# Patient Record
Sex: MALE | Race: WHITE | NOT HISPANIC OR LATINO | Employment: UNEMPLOYED | URBAN - NONMETROPOLITAN AREA
[De-identification: names, ages, dates, MRNs, and addresses within clinical notes are randomized per-mention and may not be internally consistent; named-entity substitution may affect disease eponyms.]

---

## 2017-08-17 ENCOUNTER — HOSPITAL ENCOUNTER (EMERGENCY)
Facility: HOSPITAL | Age: 52
Discharge: HOME | End: 2017-08-17
Attending: EMERGENCY MEDICINE | Admitting: EMERGENCY MEDICINE

## 2017-08-17 ENCOUNTER — APPOINTMENT (OUTPATIENT)
Dept: GENERAL RADIOLOGY | Facility: HOSPITAL | Age: 52
End: 2017-08-17

## 2017-08-17 VITALS
DIASTOLIC BLOOD PRESSURE: 92 MMHG | WEIGHT: 183 LBS | HEART RATE: 63 BPM | OXYGEN SATURATION: 99 % | BODY MASS INDEX: 24.79 KG/M2 | HEIGHT: 72 IN | TEMPERATURE: 98 F | RESPIRATION RATE: 18 BRPM | SYSTOLIC BLOOD PRESSURE: 159 MMHG

## 2017-08-17 DIAGNOSIS — S29.012A MUSCLE STRAIN OF LEFT UPPER BACK, INITIAL ENCOUNTER: ICD-10-CM

## 2017-08-17 DIAGNOSIS — R07.9 CHEST PAIN, UNSPECIFIED TYPE: Primary | ICD-10-CM

## 2017-08-17 LAB
ALBUMIN SERPL-MCNC: 4.2 G/DL (ref 3.4–4.8)
ALBUMIN/GLOB SERPL: 1.6 G/DL (ref 1.1–1.8)
ALP SERPL-CCNC: 84 U/L (ref 38–126)
ALT SERPL W P-5'-P-CCNC: 46 U/L (ref 21–72)
ANION GAP SERPL CALCULATED.3IONS-SCNC: 11 MMOL/L (ref 5–15)
AST SERPL-CCNC: 31 U/L (ref 17–59)
BASOPHILS # BLD AUTO: 0.03 10*3/MM3 (ref 0–0.2)
BASOPHILS NFR BLD AUTO: 0.5 % (ref 0–2)
BILIRUB SERPL-MCNC: 1.3 MG/DL (ref 0.2–1.3)
BILIRUB UR QL STRIP: ABNORMAL
BUN BLD-MCNC: 11 MG/DL (ref 7–21)
BUN/CREAT SERPL: 11 (ref 7–25)
CALCIUM SPEC-SCNC: 9.1 MG/DL (ref 8.4–10.2)
CHLORIDE SERPL-SCNC: 105 MMOL/L (ref 95–110)
CK MB SERPL-CCNC: 0.53 NG/ML (ref 0–5)
CK SERPL-CCNC: 142 U/L (ref 55–170)
CLARITY UR: CLEAR
CO2 SERPL-SCNC: 23 MMOL/L (ref 22–31)
COLOR UR: YELLOW
CREAT BLD-MCNC: 1 MG/DL (ref 0.7–1.3)
DEPRECATED RDW RBC AUTO: 41.7 FL (ref 35.1–43.9)
EOSINOPHIL # BLD AUTO: 0.1 10*3/MM3 (ref 0–0.7)
EOSINOPHIL NFR BLD AUTO: 1.7 % (ref 0–7)
ERYTHROCYTE [DISTWIDTH] IN BLOOD BY AUTOMATED COUNT: 13.1 % (ref 11.5–14.5)
GFR SERPL CREATININE-BSD FRML MDRD: 79 ML/MIN/1.73 (ref 63–147)
GLOBULIN UR ELPH-MCNC: 2.7 GM/DL (ref 2.3–3.5)
GLUCOSE BLD-MCNC: 114 MG/DL (ref 60–100)
GLUCOSE UR STRIP-MCNC: NEGATIVE MG/DL
HCT VFR BLD AUTO: 39.8 % (ref 39–49)
HGB BLD-MCNC: 14.6 G/DL (ref 13.7–17.3)
HGB UR QL STRIP.AUTO: NEGATIVE
HOLD SPECIMEN: NORMAL
HOLD SPECIMEN: NORMAL
IMM GRANULOCYTES # BLD: 0.01 10*3/MM3 (ref 0–0.02)
IMM GRANULOCYTES NFR BLD: 0.2 % (ref 0–0.5)
INR PPP: 1.01 (ref 0.8–1.2)
KETONES UR QL STRIP: NEGATIVE
LEUKOCYTE ESTERASE UR QL STRIP.AUTO: NEGATIVE
LIPASE SERPL-CCNC: 81 U/L (ref 23–300)
LYMPHOCYTES # BLD AUTO: 1.43 10*3/MM3 (ref 0.6–4.2)
LYMPHOCYTES NFR BLD AUTO: 24.6 % (ref 10–50)
MCH RBC QN AUTO: 31.5 PG (ref 26.5–34)
MCHC RBC AUTO-ENTMCNC: 36.7 G/DL (ref 31.5–36.3)
MCV RBC AUTO: 86 FL (ref 80–98)
MONOCYTES # BLD AUTO: 0.62 10*3/MM3 (ref 0–0.9)
MONOCYTES NFR BLD AUTO: 10.7 % (ref 0–12)
NEUTROPHILS # BLD AUTO: 3.62 10*3/MM3 (ref 2–8.6)
NEUTROPHILS NFR BLD AUTO: 62.3 % (ref 37–80)
NITRITE UR QL STRIP: NEGATIVE
NRBC BLD MANUAL-RTO: 0 /100 WBC (ref 0–0)
NT-PROBNP SERPL-MCNC: 44.7 PG/ML (ref 0–450)
PH UR STRIP.AUTO: 6.5 [PH] (ref 5–9)
PLATELET # BLD AUTO: 169 10*3/MM3 (ref 150–450)
PMV BLD AUTO: 12 FL (ref 8–12)
POTASSIUM BLD-SCNC: 3.9 MMOL/L (ref 3.5–5.1)
PROT SERPL-MCNC: 6.9 G/DL (ref 6.3–8.6)
PROT UR QL STRIP: ABNORMAL
PROTHROMBIN TIME: 13.2 SECONDS (ref 11.1–15.3)
RBC # BLD AUTO: 4.63 10*6/MM3 (ref 4.37–5.74)
SODIUM BLD-SCNC: 139 MMOL/L (ref 137–145)
SP GR UR STRIP: 1.02 (ref 1–1.03)
TROPONIN I SERPL-MCNC: <0.012 NG/ML
TROPONIN I SERPL-MCNC: <0.012 NG/ML
UROBILINOGEN UR QL STRIP: ABNORMAL
WBC NRBC COR # BLD: 5.81 10*3/MM3 (ref 3.2–9.8)
WHOLE BLOOD HOLD SPECIMEN: NORMAL
WHOLE BLOOD HOLD SPECIMEN: NORMAL

## 2017-08-17 PROCEDURE — 85610 PROTHROMBIN TIME: CPT | Performed by: EMERGENCY MEDICINE

## 2017-08-17 PROCEDURE — 93010 ELECTROCARDIOGRAM REPORT: CPT | Performed by: INTERNAL MEDICINE

## 2017-08-17 PROCEDURE — 71020 HC CHEST PA AND LATERAL: CPT

## 2017-08-17 PROCEDURE — 80053 COMPREHEN METABOLIC PANEL: CPT | Performed by: EMERGENCY MEDICINE

## 2017-08-17 PROCEDURE — 84484 ASSAY OF TROPONIN QUANT: CPT | Performed by: EMERGENCY MEDICINE

## 2017-08-17 PROCEDURE — 99285 EMERGENCY DEPT VISIT HI MDM: CPT

## 2017-08-17 PROCEDURE — 93005 ELECTROCARDIOGRAM TRACING: CPT | Performed by: EMERGENCY MEDICINE

## 2017-08-17 PROCEDURE — 82553 CREATINE MB FRACTION: CPT | Performed by: EMERGENCY MEDICINE

## 2017-08-17 PROCEDURE — 85025 COMPLETE CBC W/AUTO DIFF WBC: CPT | Performed by: EMERGENCY MEDICINE

## 2017-08-17 PROCEDURE — 81003 URINALYSIS AUTO W/O SCOPE: CPT | Performed by: EMERGENCY MEDICINE

## 2017-08-17 PROCEDURE — 82550 ASSAY OF CK (CPK): CPT | Performed by: EMERGENCY MEDICINE

## 2017-08-17 PROCEDURE — 83690 ASSAY OF LIPASE: CPT | Performed by: EMERGENCY MEDICINE

## 2017-08-17 PROCEDURE — 83880 ASSAY OF NATRIURETIC PEPTIDE: CPT | Performed by: EMERGENCY MEDICINE

## 2017-08-17 RX ORDER — CARVEDILOL 12.5 MG/1
12.5 TABLET ORAL 2 TIMES DAILY WITH MEALS
COMMUNITY

## 2017-08-17 RX ORDER — ASPIRIN 81 MG/1
81 TABLET ORAL DAILY
Qty: 30 TABLET | Refills: 0 | Status: SHIPPED | OUTPATIENT
Start: 2017-08-17

## 2017-08-17 RX ORDER — NITROGLYCERIN 0.4 MG/1
0.4 TABLET SUBLINGUAL
Status: DISCONTINUED | OUTPATIENT
Start: 2017-08-17 | End: 2017-08-17 | Stop reason: HOSPADM

## 2017-08-17 RX ORDER — SODIUM CHLORIDE 0.9 % (FLUSH) 0.9 %
10 SYRINGE (ML) INJECTION AS NEEDED
Status: DISCONTINUED | OUTPATIENT
Start: 2017-08-17 | End: 2017-08-17 | Stop reason: HOSPADM

## 2017-08-17 RX ORDER — AMLODIPINE BESYLATE 10 MG/1
10 TABLET ORAL DAILY
COMMUNITY
End: 2020-03-24 | Stop reason: HOSPADM

## 2017-08-17 RX ORDER — IBUPROFEN 800 MG/1
800 TABLET ORAL EVERY 8 HOURS PRN
Qty: 21 TABLET | Refills: 0 | OUTPATIENT
Start: 2017-08-17 | End: 2019-12-08

## 2017-08-17 RX ORDER — ASPIRIN 325 MG
325 TABLET ORAL ONCE
Status: COMPLETED | OUTPATIENT
Start: 2017-08-17 | End: 2017-08-17

## 2017-08-17 RX ORDER — CYCLOBENZAPRINE HCL 10 MG
10 TABLET ORAL 3 TIMES DAILY PRN
Qty: 15 TABLET | Refills: 0 | OUTPATIENT
Start: 2017-08-17 | End: 2020-03-12

## 2017-08-17 RX ORDER — PANTOPRAZOLE SODIUM 40 MG/1
40 TABLET, DELAYED RELEASE ORAL DAILY
COMMUNITY
End: 2017-08-24

## 2017-08-17 RX ADMIN — ASPIRIN 325 MG: 325 TABLET, COATED ORAL at 13:19

## 2017-08-17 RX ADMIN — NITROGLYCERIN 0.4 MG: 0.4 TABLET SUBLINGUAL at 13:21

## 2017-08-17 NOTE — DISCHARGE INSTRUCTIONS
Followup with Dr. Wang as needed for further management and cardiac stress testing.  Start and aspirin a day, no strenuous activity until cleared with Dr. Wang.  Return with any new or worsening symptoms, or any concerns.

## 2017-08-17 NOTE — ED PROVIDER NOTES
Subjective   HPI Comments: Patient presents with an injury originally at work where the patient felt he pulled a muscle in his back.  Patient noted about 10 pm that the pain was also in his chest and he couldn't get comfortable.  Patient did have some radiation to the arm.  Patient continued with constant pain to this afternoon when patient was seen in the clinic, sent to the ED for further evaluation.  Patient denies diaphoresis.        History provided by:  Patient   used: No        Review of Systems   Constitutional: Negative.  Negative for appetite change, chills and fever.   HENT: Negative.  Negative for congestion.    Eyes: Negative.  Negative for photophobia and visual disturbance.   Respiratory: Positive for chest tightness. Negative for cough and shortness of breath.    Cardiovascular: Positive for chest pain. Negative for palpitations.   Gastrointestinal: Negative.  Negative for abdominal pain, constipation, diarrhea, nausea and vomiting.   Endocrine: Negative.    Genitourinary: Negative.  Negative for decreased urine volume, dysuria, flank pain and hematuria.   Musculoskeletal: Positive for back pain. Negative for arthralgias, myalgias, neck pain and neck stiffness.   Skin: Negative.  Negative for pallor.   Neurological: Negative.  Negative for dizziness, syncope, weakness, light-headedness, numbness and headaches.   Psychiatric/Behavioral: Negative.  Negative for confusion and suicidal ideas. The patient is not nervous/anxious.    All other systems reviewed and are negative.      History reviewed. No pertinent past medical history.    No Known Allergies    History reviewed. No pertinent surgical history.    History reviewed. No pertinent family history.    Social History     Social History   • Marital status:      Spouse name: N/A   • Number of children: N/A   • Years of education: N/A     Social History Main Topics   • Smoking status: Current Every Day Smoker     Packs/day:  "0.50     Types: Cigarettes   • Smokeless tobacco: None   • Alcohol use Yes      Comment: \"very little\"   • Drug use: No   • Sexual activity: Not Asked     Other Topics Concern   • None     Social History Narrative   • None           Objective   Physical Exam   Constitutional: He is oriented to person, place, and time. He appears well-developed and well-nourished. No distress.   HENT:   Head: Normocephalic and atraumatic.   Eyes: Conjunctivae and EOM are normal.   Neck: Normal range of motion. Neck supple. No JVD present.   Cardiovascular: Normal rate, regular rhythm, normal heart sounds and intact distal pulses.  Exam reveals no gallop and no friction rub.    No murmur heard.  Pulmonary/Chest: Effort normal. No respiratory distress. He has no wheezes. He has no rales. He exhibits no tenderness.   Abdominal: Soft. Bowel sounds are normal. He exhibits no distension and no mass. There is no tenderness. There is no rebound and no guarding.   Musculoskeletal: Normal range of motion.   Neurological: He is alert and oriented to person, place, and time.   Skin: Skin is warm and dry.   Psychiatric: He has a normal mood and affect. His behavior is normal. Judgment and thought content normal.   Nursing note and vitals reviewed.      ECG 12 Lead    Date/Time: 8/17/2017 1:35 PM  Performed by: KORIN LEE  Authorized by: KORIN LEE   Interpreted by physician  Rhythm: sinus bradycardia  Clinical impression: normal ECG  Comments: No ST elevation               ED Course  ED Course      Labs Reviewed   COMPREHENSIVE METABOLIC PANEL - Abnormal; Notable for the following:        Result Value    Glucose 114 (*)     All other components within normal limits   CBC WITH AUTO DIFFERENTIAL - Abnormal; Notable for the following:     MCHC 36.7 (*)     All other components within normal limits   TROPONIN (IN-HOUSE) - Normal   TROPONIN (IN-HOUSE) - Normal   BNP (IN-HOUSE) - Normal   PROTIME-INR - Normal    Narrative:     Therapeutic " range for most indications is 2.0-3.0 INR,  or 2.5-3.5 for mechanical heart valves.   CK - Normal   CK MB - Normal   LIPASE - Normal   RAINBOW DRAW    Narrative:     The following orders were created for panel order Alpha Draw.  Procedure                               Abnormality         Status                     ---------                               -----------         ------                     Light Blue Top[[id number]]                                    Final result               Green Top (Gel)[[id number]]                                   Final result               Lavender Top[[id number]]                                      Final result               Gold Top - SST[[id number]]                                   Final result                 Please view results for these tests on the individual orders.   URINALYSIS W/ CULTURE IF INDICATED   CBC AND DIFFERENTIAL    Narrative:     The following orders were created for panel order CBC & Differential.  Procedure                               Abnormality         Status                     ---------                               -----------         ------                     Scan Slide[[id number]]                                                                  CBC Auto Differential[[id number]]        Abnormal            Final result                 Please view results for these tests on the individual orders.   LIGHT BLUE TOP   GREEN TOP   LAVENDER TOP   GOLD TOP - SST       XR Chest 2 View   Final Result   Negative chest      Electronically signed by:  Shoaib Huggins MD  8/17/2017 1:56 PM CDT   Workstation: [id number]        No acute findings.  Will discharge to outpatient cardiology followup.  HEART score <3.             MDM    Final diagnoses:   Chest pain, unspecified type   Muscle strain of left upper back, initial encounter            Hemanth Murillo MD  08/17/17 [id number]

## 2017-08-24 ENCOUNTER — APPOINTMENT (OUTPATIENT)
Dept: LAB | Facility: HOSPITAL | Age: 52
End: 2017-08-24

## 2017-08-24 ENCOUNTER — OFFICE VISIT (OUTPATIENT)
Dept: CARDIOLOGY | Facility: CLINIC | Age: 52
End: 2017-08-24

## 2017-08-24 VITALS
BODY MASS INDEX: 27.62 KG/M2 | DIASTOLIC BLOOD PRESSURE: 90 MMHG | WEIGHT: 203.9 LBS | HEIGHT: 72 IN | OXYGEN SATURATION: 97 % | SYSTOLIC BLOOD PRESSURE: 138 MMHG | HEART RATE: 71 BPM

## 2017-08-24 DIAGNOSIS — R07.2 PRECORDIAL PAIN: Primary | ICD-10-CM

## 2017-08-24 DIAGNOSIS — F17.200 SMOKER: ICD-10-CM

## 2017-08-24 DIAGNOSIS — R09.89 BRUIT OF LEFT CAROTID ARTERY: ICD-10-CM

## 2017-08-24 DIAGNOSIS — Z13.220 SCREENING CHOLESTEROL LEVEL: ICD-10-CM

## 2017-08-24 DIAGNOSIS — Z13.1 DIABETES MELLITUS SCREENING: ICD-10-CM

## 2017-08-24 DIAGNOSIS — R06.09 DYSPNEA ON EXERTION: ICD-10-CM

## 2017-08-24 DIAGNOSIS — I73.9 CLAUDICATION (HCC): ICD-10-CM

## 2017-08-24 LAB
ALBUMIN SERPL-MCNC: 4.3 G/DL (ref 3.4–4.8)
ALBUMIN/GLOB SERPL: 1.7 G/DL (ref 1.1–1.8)
ALP SERPL-CCNC: 64 U/L (ref 38–126)
ALT SERPL W P-5'-P-CCNC: 40 U/L (ref 21–72)
ANION GAP SERPL CALCULATED.3IONS-SCNC: 8 MMOL/L (ref 5–15)
ARTICHOKE IGE QN: 118 MG/DL (ref 1–129)
AST SERPL-CCNC: 20 U/L (ref 17–59)
BILIRUB SERPL-MCNC: 0.8 MG/DL (ref 0.2–1.3)
BUN BLD-MCNC: 14 MG/DL (ref 7–21)
BUN/CREAT SERPL: 12.6 (ref 7–25)
CALCIUM SPEC-SCNC: 9.3 MG/DL (ref 8.4–10.2)
CHLORIDE SERPL-SCNC: 104 MMOL/L (ref 95–110)
CHOLEST SERPL-MCNC: 176 MG/DL (ref 0–199)
CO2 SERPL-SCNC: 27 MMOL/L (ref 22–31)
CREAT BLD-MCNC: 1.11 MG/DL (ref 0.7–1.3)
DEPRECATED RDW RBC AUTO: 41.5 FL (ref 35.1–43.9)
ERYTHROCYTE [DISTWIDTH] IN BLOOD BY AUTOMATED COUNT: 12.9 % (ref 11.5–14.5)
GFR SERPL CREATININE-BSD FRML MDRD: 70 ML/MIN/1.73 (ref 63–147)
GLOBULIN UR ELPH-MCNC: 2.5 GM/DL (ref 2.3–3.5)
GLUCOSE BLD-MCNC: 107 MG/DL (ref 60–100)
HBA1C MFR BLD: 5.4 % (ref 4–5.6)
HCT VFR BLD AUTO: 41.3 % (ref 39–49)
HDLC SERPL-MCNC: 47 MG/DL (ref 60–200)
HGB BLD-MCNC: 14.5 G/DL (ref 13.7–17.3)
LDLC/HDLC SERPL: 2.35 {RATIO} (ref 0–3.55)
MCH RBC QN AUTO: 30.8 PG (ref 26.5–34)
MCHC RBC AUTO-ENTMCNC: 35.1 G/DL (ref 31.5–36.3)
MCV RBC AUTO: 87.7 FL (ref 80–98)
NT-PROBNP SERPL-MCNC: 39.7 PG/ML (ref 0–450)
PLATELET # BLD AUTO: 155 10*3/MM3 (ref 150–450)
PMV BLD AUTO: 13.2 FL (ref 8–12)
POTASSIUM BLD-SCNC: 4.1 MMOL/L (ref 3.5–5.1)
PROT SERPL-MCNC: 6.8 G/DL (ref 6.3–8.6)
RBC # BLD AUTO: 4.71 10*6/MM3 (ref 4.37–5.74)
SODIUM BLD-SCNC: 139 MMOL/L (ref 137–145)
TRIGL SERPL-MCNC: 93 MG/DL (ref 20–199)
WBC NRBC COR # BLD: 6.75 10*3/MM3 (ref 3.2–9.8)

## 2017-08-24 PROCEDURE — 80053 COMPREHEN METABOLIC PANEL: CPT | Performed by: INTERNAL MEDICINE

## 2017-08-24 PROCEDURE — 83880 ASSAY OF NATRIURETIC PEPTIDE: CPT | Performed by: INTERNAL MEDICINE

## 2017-08-24 PROCEDURE — 36415 COLL VENOUS BLD VENIPUNCTURE: CPT | Performed by: INTERNAL MEDICINE

## 2017-08-24 PROCEDURE — 80061 LIPID PANEL: CPT | Performed by: INTERNAL MEDICINE

## 2017-08-24 PROCEDURE — 99204 OFFICE O/P NEW MOD 45 MIN: CPT | Performed by: INTERNAL MEDICINE

## 2017-08-24 PROCEDURE — 83036 HEMOGLOBIN GLYCOSYLATED A1C: CPT | Performed by: INTERNAL MEDICINE

## 2017-08-24 PROCEDURE — 85027 COMPLETE CBC AUTOMATED: CPT | Performed by: INTERNAL MEDICINE

## 2017-08-24 RX ORDER — LISINOPRIL 40 MG/1
40 TABLET ORAL
COMMUNITY
Start: 2017-08-21 | End: 2017-09-21

## 2017-08-24 NOTE — PROGRESS NOTES
Cardiovascular Medicine      Rohit Wang M.D., Ph.D., [facility]         Dr. Brown:  Thank you for asking me to see [facility] for CP.    History of Present Illness  This is a 51 y.o. male with:    1. CPS  2. HTN  3. GERD  4. MR, mild    Chest Pain  Patient's referred from the emergency department for complaints of chest pain.  The patient was actually lifting a heavy object and twisting when he began to have the onset of epigastric pain and back pain.  This pain in his abdominal area appeared to radiate into his back.  He initially attributed these symptoms to a musculoskeletal injury.  Later, he began to get some chest discomfort and arm discomfort.  This concerned him so he presented to the emergency department.  In the emergency department he was hemodynamically stable.  I did review these records today.  His EKG was not acute.  Chest x-ray was unremarkable.  His initial troponin was normal.  He was chest pain-free in the emergency department.  A delta troponin continued to be normal, so this pain was attributed to likely a musculoskeletal event.  The patient's HEART score was less than 3.  He was discharged home with close cardiology follow-up.  The patient denies a history of MI, CHF or known CAD.   He does have a history of hypertension for which she's currently on amlodipine, lisinopril and carvedilol.  His blood pressure is normally controlled.  He is a smoker.  No premature family history of CAD.    He has had CP in the past. He tells me it is identical to the pain he had in 2007, except it was worse in 2007. He had a MPS which was low-risk. He had a TTE that was structurally normal. He did have mild MR. He has had mild pain since being seen.      Valvular heart disease  He did have mild MR on a TTE in 2007.     LE pain  He tells me he has LE pain that the bilateral calf muscles. He has pain on the side on his leg.       Review of Systems - History obtained from chart review and the  patient  General ROS: negative  Cardiovascular ROS: positive for - chest pain.  All other systems were reviewed and were negative.    family history is not on file.     reports that he has been smoking Cigarettes.  He has been smoking about 0.50 packs per day. He does not have any smokeless tobacco history on file. He reports that he drinks alcohol. He reports that he does not use illicit drugs.    No Known Allergies      Current Outpatient Prescriptions:   •  amLODIPine (NORVASC) 10 MG tablet, Take 10 mg by mouth Daily., Disp: , Rfl:   •  aspirin 81 MG EC tablet, Take 1 tablet by mouth Daily., Disp: 30 tablet, Rfl: 0  •  carvedilol (COREG) 12.5 MG tablet, Take 12.5 mg by mouth 2 (Two) Times a Day With Meals., Disp: , Rfl:   •  cyclobenzaprine (FLEXERIL) 10 MG tablet, Take 1 tablet by mouth 3 (Three) Times a Day As Needed for Muscle Spasms., Disp: 15 tablet, Rfl: 0  •  ibuprofen (ADVIL,MOTRIN) 800 MG tablet, Take 1 tablet by mouth Every 8 (Eight) Hours As Needed for Mild Pain ., Disp: 21 tablet, Rfl: 0  •  LISINOPRIL PO, Take  by mouth., Disp: , Rfl:   •  Omeprazole (PRILOSEC PO), Take  by mouth., Disp: , Rfl:   •  pantoprazole (PROTONIX) 40 MG EC tablet, Take 40 mg by mouth Daily., Disp: , Rfl:     Physical Exam:  Vitals:    08/24/17 0926   BP: 138/90   Pulse:    SpO2:      Body mass index is 27.65 kg/(m^2).    GEN: alert, appears stated age and cooperative  Body Habitus: well-nourished  Neuro: CN II-XII grossly intact.   HEENT: Head: Normocephalic, no lesions, without obvious abnormality.  Neck / Thyroid: Supple, no masses, nodes, nodules or enlargement. No arcus senilis, xanthelasma or xanthomas. PERRL. Normal external ears. No drainage. No thyromegaly. Neck supple. No LAD. Trachea midline. Nose, normal.  JVP: 6 cm of water at 45 degrees HJR: absent      Carotid:  Upstroke: easily palpated bilaterally Volume: Normal.    Carotid Bruit:  Present, left  Subclavian Bruit: Not present.    Lymph: No overt LAD.   Back:  Normal.  Chest:  Normal Excursion: Good    I:E: Good  Pulmonary:clear to auscultation, no wheezes, rales or rhonchi, symmetric air entry. Equal chest excursion. Chest physical exam is normal. No tenderness.        Precordium:  No palpable heaves or thrusts. P2 is not palpable.   Wingate:  normal size and placement Palpable S4: Not present.   Heart rate: normal  Heart Rhythm: regular     Heart Sounds: S1: normal intensity  S2: normal intensity  S3: absent   S4: absent  Opening Snap: absent  A2-OS:  N/A  Pericardial rub: absent    Ejection click: None      Murmurs: Systolic: none  Diastolic: none  Abdomen: Soft, non-tender, normal bowel sounds; no bruits, organomegaly or masses.  Extremity: no edema, cyanosis  Pulses: Right radial artery has 2+ (normal) pulse and Left radial artery has 2+ (normal) pulse    DATA REVIEWED:     PROCEDURE: Chest PA and lateral     REASON FOR EXAM: cp     FINDINGS: Comparison study dated June 26, 2014. . Cardiac and  pulmonary vasculature are normal . Lungs are clear. Pleural  spaces are normal . No acute osseous abnormality.     IMPRESSION:  Negative chest     Electronically signed by:  Shoaib Huggins MD  8/17/2017 1:56 PM CDT  Workstation: [id number]      Normal sinus rhythm  Normal ECG  When compared with ECG of 26-JUN-2014 00:07,  premature supraventricular complexes are no longer present  T wave inversion no longer evident in Inferior leads  T wave amplitude has increased in Anterolateral leads  Confirmed by JUNG MAKI MD ([id number]),  SALONI REESE ([id number]) on  8/22/2017 4:00:53 PM    ED notes reviewed. Troponins not detectable x 2.         Assessment/Plan      1. Chest pain syndrome/Dyspnea. The pain complaints are atypical.  The patient is Moderate Risk.  An ischemia evaluation is indicated. The patient is able to exercise.  EKG is Normal.  -Risks/Benefits discussed.   -A hand out was provided on the type of stress test. Questions answered.   -I have asked the patient to call 911 or be  seen in the ED for further CP complaints.   -Will plan on further evaluation with TST, TTE.  -PFTs, 6MWT    2. Mild MR.   -Echo    3. LE pain, unable to exclude claudication.   -ABIs    4. Cardiac Risk Assessment and need for statin therapy:   Unknown. Will check basic labs.    5. Carotid Bruit  -Carotid Duplex    6. Tobacco status: Smoker.  I advised patient to quit, and offered support.  Educational material distributed.  less than 3 minutes      Plan for follow-up: in 1 month      EMR Dragon/Transcription disclaimer:     Much of this encounter note is an electronic transcription. This may permit erroneous, or at times, nonsensical words or phrases to be inadvertently transcribed; Although I have reviewed the note for such errors, some may still exist.

## 2017-08-24 NOTE — PATIENT INSTRUCTIONS
Exercise Stress Electrocardiogram  An exercise stress electrocardiogram is a test to check how blood flows to your heart. It is done to find areas of poor blood flow. You will need to walk on a treadmill for this test. The electrocardiogram will record your heartbeat when you are at rest and when you are exercising.  BEFORE THE PROCEDURE  · Do not have drinks with caffeine or foods with caffeine for 24 hours before the test, or as told by your doctor. This includes coffee, tea (even decaf tea), sodas, chocolate, and cocoa.  · Follow your doctor's instructions about eating and drinking before the test.  · Ask your doctor what medicines you should or should not take before the test. Take your medicines with water unless told by your doctor not to.  · If you use an inhaler, bring it with you to the test.  · Bring a snack to eat after the test.  · Do not  smoke for 4 hours before the test.  · Do not put lotions, powders, creams, or oils on your chest before the test.  · Wear comfortable shoes and clothing.  PROCEDURE  · You will have patches put on your chest. Small areas of your chest may need to be shaved. Wires will be connected to the patches.  · Your heart rate will be watched while you are resting and while you are exercising.  · You will walk on the treadmill. The treadmill will slowly get faster to raise your heart rate.  · The test will take about 1-2 hours.  AFTER THE PROCEDURE  · Your heart rate and blood pressure will be watched after the test.  · You may return to your normal diet, activities, and medicines or as told by your doctor.     This information is not intended to replace advice given to you by your health care provider. Make sure you discuss any questions you have with your health care provider.     Document Released: 06/05/2009 Document Revised: 01/08/2016 Document Reviewed: 08/25/2014  "[employer] Interactive Patient Education ©2017 "[employer] Inc.  Steps to Quit Smoking   Smoking tobacco can be  harmful to your health and can affect almost every organ in your body. Smoking puts you, and those around you, at risk for developing many serious chronic diseases. Quitting smoking is difficult, but it is one of the best things that you can do for your health. It is never too late to quit.  WHAT ARE THE BENEFITS OF QUITTING SMOKING?  When you quit smoking, you lower your risk of developing serious diseases and conditions, such as:  · Lung cancer or lung disease, such as COPD.  · Heart disease.  · Stroke.  · Heart attack.  · Infertility.  · Osteoporosis and bone fractures.  Additionally, symptoms such as coughing, wheezing, and shortness of breath may get better when you quit. You may also find that you get sick less often because your body is stronger at fighting off colds and infections. If you are pregnant, quitting smoking can help to reduce your chances of having a baby of low birth weight.  HOW DO I GET READY TO QUIT?  When you decide to quit smoking, create a plan to make sure that you are successful. Before you quit:  · Pick a date to quit. Set a date within the next two weeks to give you time to prepare.  · Write down the reasons why you are quitting. Keep this list in places where you will see it often, such as on your bathroom mirror or in your car or wallet.  · Identify the people, places, things, and activities that make you want to smoke (triggers) and avoid them. Make sure to take these actions:    Throw away all cigarettes at home, at work, and in your car.    Throw away smoking accessories, such as ashtrays and lighters.    Clean your car and make sure to empty the ashtray.    Clean your home, including curtains and carpets.  · Tell your family, friends, and coworkers that you are quitting. Support from your loved ones can make quitting easier.  · Talk with your health care provider about your options for quitting smoking.  · Find out what treatment options are covered by your health  "insurance.  WHAT STRATEGIES CAN I USE TO QUIT SMOKING?   Talk with your healthcare provider about different strategies to quit smoking. Some strategies include:  · Quitting smoking altogether instead of gradually lessening how much you smoke over a period of time. Research shows that quitting \"cold turkey\" is more successful than gradually quitting.  · Attending in-person counseling to help you build problem-solving skills. You are more likely to have success in quitting if you attend several counseling sessions. Even short sessions of 10 minutes can be effective.  · Finding resources and support systems that can help you to quit smoking and remain smoke-free after you quit. These resources are most helpful when you use them often. They can include:    Online chats with a counselor.    Telephone quitlines.    Printed self-help materials.    Support groups or group counseling.    Text messaging programs.    Mobile phone applications.  · Taking medicines to help you quit smoking. (If you are pregnant or breastfeeding, talk with your health care provider first.) Some medicines contain nicotine and some do not. Both types of medicines help with cravings, but the medicines that include nicotine help to relieve withdrawal symptoms. Your health care provider may recommend:    Nicotine patches, gum, or lozenges.    Nicotine inhalers or sprays.    Non-nicotine medicine that is taken by mouth.  Talk with your health care provider about combining strategies, such as taking medicines while you are also receiving in-person counseling. Using these two strategies together makes you more likely to succeed in quitting than if you used either strategy on its own.  If you are pregnant or breastfeeding, talk with your health care provider about finding counseling or other support strategies to quit smoking. Do not take medicine to help you quit smoking unless told to do so by your health care provider.  WHAT THINGS CAN I DO TO MAKE IT " EASIER TO QUIT?  Quitting smoking might feel overwhelming at first, but there is a lot that you can do to make it easier. Take these important actions:  · Reach out to your family and friends and ask that they support and encourage you during this time. Call telephone quitlines, reach out to support groups, or work with a counselor for support.  · Ask people who smoke to avoid smoking around you.  · Avoid places that trigger you to smoke, such as bars, parties, or smoke-break areas at work.  · Spend time around people who do not smoke.  · Lessen stress in your life, because stress can be a smoking trigger for some people. To lessen stress, try:    Exercising regularly.    Deep-breathing exercises.    Yoga.    Meditating.    Performing a body scan. This involves closing your eyes, scanning your body from head to toe, and noticing which parts of your body are particularly tense. Purposefully relax the muscles in those areas.  · Download or purchase mobile phone or tablet apps (applications) that can help you stick to your quit plan by providing reminders, tips, and encouragement. There are many free apps, such as QuitGuide from the CDC (Centers for Disease Control and Prevention). You can find other support for quitting smoking (smoking cessation) through smokefree.gov and other websites.  HOW WILL I FEEL WHEN I QUIT SMOKING?  Within the first 24 hours of quitting smoking, you may start to feel some withdrawal symptoms. These symptoms are usually most noticeable 2-3 days after quitting, but they usually do not last beyond 2-3 weeks. Changes or symptoms that you might experience include:  · Mood swings.  · Restlessness, anxiety, or irritation.  · Difficulty concentrating.  · Dizziness.  · Strong cravings for sugary foods in addition to nicotine.  · Mild weight gain.  · Constipation.  · Nausea.  · Coughing or a sore throat.  · Changes in how your medicines work in your body.  · A depressed mood.  · Difficulty sleeping  (insomnia).  After the first 2-3 weeks of quitting, you may start to notice more positive results, such as:  · Improved sense of smell and taste.  · Decreased coughing and sore throat.  · Slower heart rate.  · Lower blood pressure.  · Clearer skin.  · The ability to breathe more easily.  · Fewer sick days.  Quitting smoking is very challenging for most people. Do not get discouraged if you are not successful the first time. Some people need to make many attempts to quit before they achieve long-term success. Do your best to stick to your quit plan, and talk with your health care provider if you have any questions or concerns.     This information is not intended to replace advice given to you by your health care provider. Make sure you discuss any questions you have with your health care provider.     Document Released: 12/12/2002 Document Revised: 05/03/2016 Document Reviewed: 05/03/2016  [employer] Interactive Patient Education ©2017 [employer]

## 2018-01-13 ENCOUNTER — APPOINTMENT (OUTPATIENT)
Dept: CT IMAGING | Facility: HOSPITAL | Age: 53
End: 2018-01-13

## 2018-01-13 ENCOUNTER — HOSPITAL ENCOUNTER (EMERGENCY)
Facility: HOSPITAL | Age: 53
Discharge: LEFT BEFORE DISCHARGE | End: 2018-01-14
Attending: EMERGENCY MEDICINE | Admitting: EMERGENCY MEDICINE

## 2018-01-13 ENCOUNTER — APPOINTMENT (OUTPATIENT)
Dept: GENERAL RADIOLOGY | Facility: HOSPITAL | Age: 53
End: 2018-01-13

## 2018-01-13 DIAGNOSIS — H54.3 VISION LOSS, BILATERAL: Primary | ICD-10-CM

## 2018-01-13 LAB
ALBUMIN SERPL-MCNC: 4.5 G/DL (ref 3.4–4.8)
ALBUMIN/GLOB SERPL: 1.5 G/DL (ref 1.1–1.8)
ALP SERPL-CCNC: 79 U/L (ref 38–126)
ALT SERPL W P-5'-P-CCNC: 38 U/L (ref 21–72)
AMPHET+METHAMPHET UR QL: NEGATIVE
ANION GAP SERPL CALCULATED.3IONS-SCNC: 16 MMOL/L (ref 5–15)
APAP SERPL-MCNC: <10 MCG/ML (ref 10–30)
AST SERPL-CCNC: 27 U/L (ref 17–59)
BARBITURATES UR QL SCN: NEGATIVE
BASOPHILS # BLD AUTO: 0.03 10*3/MM3 (ref 0–0.2)
BASOPHILS NFR BLD AUTO: 0.4 % (ref 0–2)
BENZODIAZ UR QL SCN: NEGATIVE
BILIRUB SERPL-MCNC: 0.2 MG/DL (ref 0.2–1.3)
BUN BLD-MCNC: 14 MG/DL (ref 7–21)
BUN/CREAT SERPL: 15.7 (ref 7–25)
CALCIUM SPEC-SCNC: 9.4 MG/DL (ref 8.4–10.2)
CANNABINOIDS SERPL QL: NEGATIVE
CHLORIDE SERPL-SCNC: 108 MMOL/L (ref 95–110)
CO2 SERPL-SCNC: 22 MMOL/L (ref 22–31)
COCAINE UR QL: NEGATIVE
CREAT BLD-MCNC: 0.89 MG/DL (ref 0.7–1.3)
DEPRECATED RDW RBC AUTO: 43 FL (ref 35.1–43.9)
EOSINOPHIL # BLD AUTO: 0.09 10*3/MM3 (ref 0–0.7)
EOSINOPHIL NFR BLD AUTO: 1.3 % (ref 0–7)
ERYTHROCYTE [DISTWIDTH] IN BLOOD BY AUTOMATED COUNT: 13.2 % (ref 11.5–14.5)
ETHANOL BLD-MCNC: 223 MG/DL (ref 0–10)
ETHANOL UR QL: 0.22 %
GFR SERPL CREATININE-BSD FRML MDRD: 90 ML/MIN/1.73 (ref 60–130)
GLOBULIN UR ELPH-MCNC: 3 GM/DL (ref 2.3–3.5)
GLUCOSE BLD-MCNC: 103 MG/DL (ref 60–100)
HCT VFR BLD AUTO: 41.5 % (ref 39–49)
HGB BLD-MCNC: 15 G/DL (ref 13.7–17.3)
HOLD SPECIMEN: NORMAL
HOLD SPECIMEN: NORMAL
IMM GRANULOCYTES # BLD: 0.01 10*3/MM3 (ref 0–0.02)
IMM GRANULOCYTES NFR BLD: 0.1 % (ref 0–0.5)
LYMPHOCYTES # BLD AUTO: 2.5 10*3/MM3 (ref 0.6–4.2)
LYMPHOCYTES NFR BLD AUTO: 36.4 % (ref 10–50)
MAGNESIUM SERPL-MCNC: 2.2 MG/DL (ref 1.6–2.3)
MCH RBC QN AUTO: 31.8 PG (ref 26.5–34)
MCHC RBC AUTO-ENTMCNC: 36.1 G/DL (ref 31.5–36.3)
MCV RBC AUTO: 88.1 FL (ref 80–98)
METHADONE UR QL SCN: NEGATIVE
MONOCYTES # BLD AUTO: 0.52 10*3/MM3 (ref 0–0.9)
MONOCYTES NFR BLD AUTO: 7.6 % (ref 0–12)
NEUTROPHILS # BLD AUTO: 3.72 10*3/MM3 (ref 2–8.6)
NEUTROPHILS NFR BLD AUTO: 54.2 % (ref 37–80)
OPIATES UR QL: NEGATIVE
OSMOLALITY SERPL: 373 MOSM/KG (ref 280–290)
OXYCODONE UR QL SCN: NEGATIVE
PHOSPHATE SERPL-MCNC: 3.8 MG/DL (ref 2.4–4.4)
PLATELET # BLD AUTO: 178 10*3/MM3 (ref 150–450)
PMV BLD AUTO: 12 FL (ref 8–12)
POTASSIUM BLD-SCNC: 3.8 MMOL/L (ref 3.5–5.1)
PROT SERPL-MCNC: 7.5 G/DL (ref 6.3–8.6)
RBC # BLD AUTO: 4.71 10*6/MM3 (ref 4.37–5.74)
SALICYLATES SERPL-MCNC: <1 MG/DL (ref 10–20)
SODIUM BLD-SCNC: 146 MMOL/L (ref 137–145)
T4 FREE SERPL-MCNC: 1.03 NG/DL (ref 0.78–2.19)
TROPONIN I SERPL-MCNC: <0.012 NG/ML
TSH SERPL DL<=0.05 MIU/L-ACNC: 1.29 MIU/ML (ref 0.46–4.68)
WBC NRBC COR # BLD: 6.87 10*3/MM3 (ref 3.2–9.8)
WHOLE BLOOD HOLD SPECIMEN: NORMAL
WHOLE BLOOD HOLD SPECIMEN: NORMAL

## 2018-01-13 PROCEDURE — 83735 ASSAY OF MAGNESIUM: CPT | Performed by: EMERGENCY MEDICINE

## 2018-01-13 PROCEDURE — 80307 DRUG TEST PRSMV CHEM ANLYZR: CPT | Performed by: EMERGENCY MEDICINE

## 2018-01-13 PROCEDURE — 84439 ASSAY OF FREE THYROXINE: CPT | Performed by: EMERGENCY MEDICINE

## 2018-01-13 PROCEDURE — 93010 ELECTROCARDIOGRAM REPORT: CPT | Performed by: INTERNAL MEDICINE

## 2018-01-13 PROCEDURE — 84484 ASSAY OF TROPONIN QUANT: CPT | Performed by: EMERGENCY MEDICINE

## 2018-01-13 PROCEDURE — 80053 COMPREHEN METABOLIC PANEL: CPT | Performed by: EMERGENCY MEDICINE

## 2018-01-13 PROCEDURE — 85025 COMPLETE CBC W/AUTO DIFF WBC: CPT | Performed by: EMERGENCY MEDICINE

## 2018-01-13 PROCEDURE — 93005 ELECTROCARDIOGRAM TRACING: CPT | Performed by: EMERGENCY MEDICINE

## 2018-01-13 PROCEDURE — 99284 EMERGENCY DEPT VISIT MOD MDM: CPT

## 2018-01-13 PROCEDURE — 70450 CT HEAD/BRAIN W/O DYE: CPT

## 2018-01-13 PROCEDURE — 71045 X-RAY EXAM CHEST 1 VIEW: CPT

## 2018-01-13 PROCEDURE — 84100 ASSAY OF PHOSPHORUS: CPT | Performed by: EMERGENCY MEDICINE

## 2018-01-13 PROCEDURE — 84443 ASSAY THYROID STIM HORMONE: CPT | Performed by: EMERGENCY MEDICINE

## 2018-01-13 PROCEDURE — 83930 ASSAY OF BLOOD OSMOLALITY: CPT | Performed by: EMERGENCY MEDICINE

## 2018-01-13 RX ORDER — SODIUM CHLORIDE 0.9 % (FLUSH) 0.9 %
10 SYRINGE (ML) INJECTION AS NEEDED
Status: DISCONTINUED | OUTPATIENT
Start: 2018-01-13 | End: 2018-01-14 | Stop reason: HOSPADM

## 2018-01-13 RX ORDER — AMLODIPINE BESYLATE 10 MG/1
10 TABLET ORAL DAILY
COMMUNITY
Start: 2017-10-02 | End: 2018-10-03

## 2018-01-13 RX ORDER — ATORVASTATIN CALCIUM 40 MG/1
40 TABLET, FILM COATED ORAL
COMMUNITY
Start: 2017-11-21 | End: 2018-11-22

## 2018-01-13 RX ORDER — CARVEDILOL 12.5 MG/1
12.5 TABLET ORAL DAILY
COMMUNITY
End: 2018-12-03

## 2018-01-13 RX ORDER — HYDROCHLOROTHIAZIDE 12.5 MG/1
12.5 CAPSULE, GELATIN COATED ORAL DAILY
COMMUNITY
Start: 2017-10-02

## 2018-01-14 VITALS
HEART RATE: 60 BPM | WEIGHT: 206 LBS | OXYGEN SATURATION: 93 % | DIASTOLIC BLOOD PRESSURE: 63 MMHG | TEMPERATURE: 97.6 F | RESPIRATION RATE: 19 BRPM | HEIGHT: 72 IN | BODY MASS INDEX: 27.9 KG/M2 | SYSTOLIC BLOOD PRESSURE: 124 MMHG

## 2018-01-14 NOTE — ED NOTES
Pt states he does not want to go to [facility]. He says he will not be able to get a ride. Dr. Benites informed. Pt signed AMA form. INT d/c with cath intact. Dsg applied. Ambulatory to lobby to wait for a ride.     GUILLERMINA Urbano RN  01/14/18 0053

## 2018-01-14 NOTE — ED PROVIDER NOTES
Subjective   History of Present Illness  52-year-old male with a history of hypertension presents to the emergency department because of vision loss.  He states that he woke up from taking a nap at approximately 3:15 this afternoon and was unable to see.  It is somewhat improved since that time to where he now has some blurred vision where he can see some light and shadows and vaguely see some objects.  Denies any other current symptoms.  Earlier in the day he reported that he had some mild chest pain approximate 6 AM that resolved.  He states he has chest pain acquired a regular basis is attributed to stress.  Never had a heart attack before.  At this moment he denies having any chest pain.  He denies having any headaches.  He was normal when he laid down to take a nap around 2 in the afternoon.  He woke he states at its vision was completely black and that he could not see even light.  Since then and is slowly improved.  He states he had similar symptoms once in the past after he sustained head trauma requiring stitches.  Does not require neurosurgical intervention.  Denies any recent severe head trauma although he does state that he bumped his head earlier in the day.  Did not lose consciousness.  He also reports that he drinks of alcohol today.  States it was store-bought vodka.  He denies drinking any other alcohols such as moonshine, methanol, rubbing alcohol or anti-freeze.    Review of Systems   Constitutional: Negative for fever.   HENT: Negative for congestion, nosebleeds, postnasal drip, sinus pressure and sore throat.    Eyes:        Negative except for history of present illness   Respiratory: Negative for cough, chest tightness, shortness of breath, wheezing and stridor.    Gastrointestinal: Negative for abdominal pain, constipation, diarrhea, nausea and vomiting.   Genitourinary: Negative for dysuria, flank pain, frequency, hematuria, testicular pain and urgency.   Skin: Negative for rash.  "  Neurological: Negative for syncope, light-headedness and headaches.   Psychiatric/Behavioral: Negative.        Past Medical History:   Diagnosis Date   • Chest pain    • Dehydration    • Dizziness    • Hypertension        No Known Allergies    Past Surgical History:   Procedure Laterality Date   • APPENDECTOMY     • LEG SURGERY         Family History   Problem Relation Age of Onset   • Heart disease Mother    • Diabetes Mother    • Lung cancer Father    • Leukemia Sister    • Diabetes Sister    • Diabetes Brother        Social History     Social History   • Marital status:      Spouse name: N/A   • Number of children: N/A   • Years of education: N/A     Social History Main Topics   • Smoking status: Current Every Day Smoker     Packs/day: 0.50     Types: Cigarettes   • Smokeless tobacco: Never Used   • Alcohol use Yes      Comment: \"very little\"   • Drug use: No   • Sexual activity: Defer     Other Topics Concern   • None     Social History Narrative           Objective   Physical Exam   Constitutional: He is oriented to person, place, and time. He appears well-developed and well-nourished.   HENT:   Head: Normocephalic and atraumatic.   Right Ear: External ear normal.   Left Ear: External ear normal.   Mouth/Throat: Oropharynx is clear and moist.   Eyes: Conjunctivae and EOM are normal. Pupils are equal, round, and reactive to light.   Neck: Normal range of motion. Neck supple.   No carotid bruits   Cardiovascular: Normal rate, regular rhythm and normal heart sounds.    Pulmonary/Chest: Effort normal and breath sounds normal.   Abdominal: Soft. He exhibits no distension. There is no tenderness. There is no rebound and no guarding.   Musculoskeletal: Normal range of motion.   Neurological: He is alert and oriented to person, place, and time.   Patient has some decreased vision.  Mostly decreased in the lateral visual fields.  He has blurry vision otherwise.  He can vaguely see fingers and only can " occasionally see the correct number.  He has difficult time with coordination finger-nose-finger secondary to his vision loss.  He moves all the extremities.  Does not appear to have any other focal neurological deficits at this time.   Skin: Skin is warm and dry.   Psychiatric: He has a normal mood and affect.   Nursing note and vitals reviewed.      ECG 12 Lead    Date/Time: 1/13/2018 9:22 PM  Performed by: YENIFER VILLEGAS  Authorized by: YENIFER VILLEGAS   Interpreted by physician  Comments: Sinus rate of 76.  Good R-wave progression.  No ST elevations or depressions.  Normal EKG.  No STEMI               ED Course  ED Course      Labs Reviewed   COMPREHENSIVE METABOLIC PANEL - Abnormal; Notable for the following:        Result Value    Glucose 103 (*)     Sodium 146 (*)     Anion Gap 16.0 (*)     All other components within normal limits   ETHANOL - Abnormal; Notable for the following:     Ethanol 223 (*)     All other components within normal limits   OSMOLALITY, SERUM - Abnormal; Notable for the following:     Osmolality 373 (*)     All other components within normal limits   SALICYLATE LEVEL - Abnormal; Notable for the following:     Salicylate <1.0 (*)     All other components within normal limits   ACETAMINOPHEN LEVEL - Abnormal; Notable for the following:     Acetaminophen <10.0 (*)     All other components within normal limits   TROPONIN (IN-HOUSE) - Normal   CBC WITH AUTO DIFFERENTIAL - Normal   T4, FREE - Normal   TSH - Normal   PHOSPHORUS - Normal   MAGNESIUM - Normal   URINE DRUG SCREEN - Normal    Narrative:     Negative Thresholds For Drugs Screened in Urine:     Amphetamines          500 ng/ml  Barbiturates          200 ng/ml  Benzodiazepines       200 ng/ml  Cocaine               150 ng/ml  Methadone             300 ng/mL  Opiates               300 ng/mL  Oxycodone             100 ng/mL  THC                   20 ng/mL    The normal value for all drugs tested is negative. This  report includes final unconfirmed screening results.  A positive result by this assay can be, at your request, sent to the Reference Lab for confirmation by gas chromatography. Unconfirmed results must not be used for non-medical purposes, such as employment or legal testing. Clinical consideration should be applied to any drug of abuse test result, particularly when unconfirmed results are used.   RAINBOW DRAW    Narrative:     The following orders were created for panel order [place] Draw.  Procedure                               Abnormality         Status                     ---------                               -----------         ------                     Light Blue Top[[id number]]                                   Final result               Green Top (Gel)[[id number]]                                  Final result               Lavender Top[[id number]]                                     Final result               Gold Top - SST[[id number]]                                   Final result                 Please view results for these tests on the individual orders.   CBC AND DIFFERENTIAL    Narrative:     The following orders were created for panel order CBC & Differential.  Procedure                               Abnormality         Status                     ---------                               -----------         ------                     CBC Auto Differential[[id number]]        Normal              Final result                 Please view results for these tests on the individual orders.   LIGHT BLUE TOP   GREEN TOP   LAVENDER TOP   GOLD TOP - SST     XR Chest 1 View   Final Result   No acute cardiopulmonary abnormality.      Electronically signed by:  Vikram Strange MD  1/13/2018 9:53 PM   CST Workstation: [id number]      CT Head Without Contrast   Final Result   No obvious acute intracranial abnormality.      Electronically signed by:  Vikram Strange MD  1/13/2018 9:52 PM   CST Workstation:  [id number]                    MDM  Number of Diagnoses or Management Options  Diagnosis management comments: Patient with vision loss earlier in the day.  It is beginning to improve.  No other signs of any focal neurological deficits.  This appears to be isolated vision problems.  It is affected both eyes.  Does not sound like a retinal artery or vein occlusion.  Does not appear to be retinal detachment or vitreous detachment.  There is no trauma to the area.  His extraocular movements are intact.  Denies drinking any toxic alcohols they have caused him to have vision loss.  Denies any trauma.  Workup is pending.  We'll obtain a CT head as well.  He doesn't chest pain earlier in the day but this does not appear to be an aortic dissection based off physical exam his history.  He is not currently having any chest pain has normal blood pressure.  He has no bruits.  He lost be very strange for him to have bilateral vision loss secondary drain or dissection without any other symptoms.  Currently he has no pain whatsoever.  I'm not seeing any afferent pupillary defects.  Possible the patient's symptoms are related to a central process, toxin-induced from something such as alcohol, compression at the optic high as him although I would have a hard time explaining his complete vision loss earlier and subsequent mild peripheral vision loss.  I expect this patient will likely end up requiring transfer given he still has some vision issues with unclear etiology.  His workup is pending at this time.  He is Wells on the window for TPA this is an ischemic stroke.  He has no headache or other symptoms suggest subarachnoid hemorrhage.  Stated he had no pain in either eye associated with this.      Osmolar gap of 11.  Does not appear to have any toxic alcohols in the system.      His workup only shows an elevated alcohol level.  We will transfer to [facility] for further care.  I discussed the case with Dr. Siddiqui who accepts  the patient in transfer.      Final diagnoses:   Vision loss, bilateral            Marc Benites MD  01/13/18 2238       Marc Benites MD  01/13/18 1364

## 2018-01-14 NOTE — ED TRIAGE NOTES
Pt reports loss of vision today. States multiple start times-1515, 1600,1700, and 1900. Pt states he has had half of a fifth of vodka tonight.

## 2018-12-03 PROBLEM — J20.9 ACUTE BRONCHITIS: Status: ACTIVE | Noted: 2018-12-03

## 2019-03-07 ENCOUNTER — APPOINTMENT (OUTPATIENT)
Dept: GENERAL RADIOLOGY | Facility: HOSPITAL | Age: 54
End: 2019-03-07

## 2019-03-07 ENCOUNTER — HOSPITAL ENCOUNTER (EMERGENCY)
Facility: HOSPITAL | Age: 54
Discharge: HOME | End: 2019-03-08
Attending: EMERGENCY MEDICINE | Admitting: EMERGENCY MEDICINE

## 2019-03-07 ENCOUNTER — APPOINTMENT (OUTPATIENT)
Dept: CT IMAGING | Facility: HOSPITAL | Age: 54
End: 2019-03-07

## 2019-03-07 DIAGNOSIS — R19.7 VOMITING AND DIARRHEA: ICD-10-CM

## 2019-03-07 DIAGNOSIS — R10.32 LEFT LOWER QUADRANT PAIN: Primary | ICD-10-CM

## 2019-03-07 DIAGNOSIS — R11.10 VOMITING AND DIARRHEA: ICD-10-CM

## 2019-03-07 LAB
ALBUMIN SERPL-MCNC: 4.4 G/DL (ref 3.4–4.8)
ALBUMIN/GLOB SERPL: 1.6 G/DL (ref 1.1–1.8)
ALP SERPL-CCNC: 71 U/L (ref 38–126)
ALT SERPL W P-5'-P-CCNC: 30 U/L (ref 21–72)
ANION GAP SERPL CALCULATED.3IONS-SCNC: 10 MMOL/L (ref 5–15)
AST SERPL-CCNC: 20 U/L (ref 17–59)
BASOPHILS # BLD AUTO: 0.02 10*3/MM3 (ref 0–0.2)
BASOPHILS NFR BLD AUTO: 0.2 % (ref 0–1.5)
BILIRUB SERPL-MCNC: 0.8 MG/DL (ref 0.2–1.3)
BILIRUB UR QL STRIP: NEGATIVE
BUN BLD-MCNC: 28 MG/DL (ref 7–21)
BUN/CREAT SERPL: 25.5 (ref 7–25)
CALCIUM SPEC-SCNC: 8.9 MG/DL (ref 8.4–10.2)
CHLORIDE SERPL-SCNC: 100 MMOL/L (ref 95–110)
CLARITY UR: CLEAR
CO2 SERPL-SCNC: 22 MMOL/L (ref 22–31)
COLOR UR: YELLOW
CREAT BLD-MCNC: 1.1 MG/DL (ref 0.7–1.3)
DEPRECATED RDW RBC AUTO: 40.5 FL (ref 37–54)
EOSINOPHIL # BLD AUTO: 0.03 10*3/MM3 (ref 0–0.4)
EOSINOPHIL NFR BLD AUTO: 0.3 % (ref 0.3–6.2)
ERYTHROCYTE [DISTWIDTH] IN BLOOD BY AUTOMATED COUNT: 13 % (ref 12.3–15.4)
GFR SERPL CREATININE-BSD FRML MDRD: 71 ML/MIN/1.73 (ref 56–130)
GLOBULIN UR ELPH-MCNC: 2.8 GM/DL (ref 2.3–3.5)
GLUCOSE BLD-MCNC: 108 MG/DL (ref 60–100)
GLUCOSE UR STRIP-MCNC: NEGATIVE MG/DL
HCT VFR BLD AUTO: 41.1 % (ref 37.5–51)
HGB BLD-MCNC: 14.4 G/DL (ref 13–17.7)
HGB UR QL STRIP.AUTO: NEGATIVE
HOLD SPECIMEN: NORMAL
IMM GRANULOCYTES # BLD AUTO: 0.04 10*3/MM3 (ref 0–0.05)
IMM GRANULOCYTES NFR BLD AUTO: 0.4 % (ref 0–0.5)
KETONES UR QL STRIP: NEGATIVE
LEUKOCYTE ESTERASE UR QL STRIP.AUTO: NEGATIVE
LIPASE SERPL-CCNC: 46 U/L (ref 23–300)
LYMPHOCYTES # BLD AUTO: 0.74 10*3/MM3 (ref 0.7–3.1)
LYMPHOCYTES NFR BLD AUTO: 7.5 % (ref 19.6–45.3)
MCH RBC QN AUTO: 29.9 PG (ref 26.6–33)
MCHC RBC AUTO-ENTMCNC: 35 G/DL (ref 31.5–35.7)
MCV RBC AUTO: 85.4 FL (ref 79–97)
MONOCYTES # BLD AUTO: 0.54 10*3/MM3 (ref 0.1–0.9)
MONOCYTES NFR BLD AUTO: 5.5 % (ref 5–12)
NEUTROPHILS # BLD AUTO: 8.46 10*3/MM3 (ref 1.4–7)
NEUTROPHILS NFR BLD AUTO: 86.1 % (ref 42.7–76)
NITRITE UR QL STRIP: NEGATIVE
NRBC BLD AUTO-RTO: 0 /100 WBC (ref 0–0)
PH UR STRIP.AUTO: 5.5 [PH] (ref 5–9)
PLATELET # BLD AUTO: 174 10*3/MM3 (ref 140–450)
PMV BLD AUTO: 12.6 FL (ref 6–12)
POTASSIUM BLD-SCNC: 3.5 MMOL/L (ref 3.5–5.1)
PROT SERPL-MCNC: 7.2 G/DL (ref 6.3–8.6)
PROT UR QL STRIP: NEGATIVE
RBC # BLD AUTO: 4.81 10*6/MM3 (ref 4.14–5.8)
SODIUM BLD-SCNC: 132 MMOL/L (ref 137–145)
SP GR UR STRIP: 1.02 (ref 1–1.03)
UROBILINOGEN UR QL STRIP: NORMAL
WBC NRBC COR # BLD: 9.83 10*3/MM3 (ref 3.4–10.8)
WHOLE BLOOD HOLD SPECIMEN: NORMAL

## 2019-03-07 PROCEDURE — 93010 ELECTROCARDIOGRAM REPORT: CPT | Performed by: INTERNAL MEDICINE

## 2019-03-07 PROCEDURE — 93005 ELECTROCARDIOGRAM TRACING: CPT | Performed by: EMERGENCY MEDICINE

## 2019-03-07 PROCEDURE — 99284 EMERGENCY DEPT VISIT MOD MDM: CPT

## 2019-03-07 PROCEDURE — 81003 URINALYSIS AUTO W/O SCOPE: CPT | Performed by: EMERGENCY MEDICINE

## 2019-03-07 PROCEDURE — 25010000002 IOPAMIDOL 61 % SOLUTION: Performed by: EMERGENCY MEDICINE

## 2019-03-07 PROCEDURE — 85025 COMPLETE CBC W/AUTO DIFF WBC: CPT | Performed by: EMERGENCY MEDICINE

## 2019-03-07 PROCEDURE — 83690 ASSAY OF LIPASE: CPT | Performed by: EMERGENCY MEDICINE

## 2019-03-07 PROCEDURE — 74022 RADEX COMPL AQT ABD SERIES: CPT

## 2019-03-07 PROCEDURE — 96360 HYDRATION IV INFUSION INIT: CPT

## 2019-03-07 PROCEDURE — 80053 COMPREHEN METABOLIC PANEL: CPT | Performed by: EMERGENCY MEDICINE

## 2019-03-07 PROCEDURE — 74177 CT ABD & PELVIS W/CONTRAST: CPT

## 2019-03-07 RX ORDER — SODIUM CHLORIDE 0.9 % (FLUSH) 0.9 %
10 SYRINGE (ML) INJECTION AS NEEDED
Status: DISCONTINUED | OUTPATIENT
Start: 2019-03-07 | End: 2019-03-08 | Stop reason: HOSPADM

## 2019-03-07 RX ORDER — SODIUM CHLORIDE 9 MG/ML
125 INJECTION, SOLUTION INTRAVENOUS CONTINUOUS
Status: DISCONTINUED | OUTPATIENT
Start: 2019-03-07 | End: 2019-03-08 | Stop reason: HOSPADM

## 2019-03-07 RX ADMIN — IOPAMIDOL 94 ML: 612 INJECTION, SOLUTION INTRAVENOUS at 22:52

## 2019-03-07 RX ADMIN — SODIUM CHLORIDE 125 ML/HR: 9 INJECTION, SOLUTION INTRAVENOUS at 21:01

## 2019-03-08 VITALS
RESPIRATION RATE: 18 BRPM | WEIGHT: 219 LBS | OXYGEN SATURATION: 95 % | SYSTOLIC BLOOD PRESSURE: 120 MMHG | BODY MASS INDEX: 29.66 KG/M2 | HEART RATE: 92 BPM | DIASTOLIC BLOOD PRESSURE: 75 MMHG | TEMPERATURE: 97.4 F | HEIGHT: 72 IN

## 2019-03-08 RX ORDER — LOPERAMIDE HYDROCHLORIDE 2 MG/1
2 CAPSULE ORAL 4 TIMES DAILY PRN
Qty: 8 CAPSULE | Refills: 0 | OUTPATIENT
Start: 2019-03-08 | End: 2020-03-12

## 2019-03-08 RX ORDER — DICYCLOMINE HCL 20 MG
20 TABLET ORAL EVERY 6 HOURS PRN
Qty: 6 TABLET | Refills: 0 | OUTPATIENT
Start: 2019-03-08 | End: 2020-03-12

## 2019-03-08 RX ORDER — ONDANSETRON 4 MG/1
4 TABLET, ORALLY DISINTEGRATING ORAL EVERY 8 HOURS PRN
Qty: 10 TABLET | Refills: 0 | OUTPATIENT
Start: 2019-03-08 | End: 2020-03-12

## 2019-03-08 NOTE — DISCHARGE INSTRUCTIONS
Clear liquid diet x24hrs  Return ED fever, vomiting, dehydration, bleeding, abdominal pain, worse condition, other concerns

## 2019-03-08 NOTE — ED PROVIDER NOTES
"Subjective   52yo male pmh significant htn/hyperlipidemia/migraine/appendectomy presents ED c/o 1d hx suprabubic/LLQ abdominal discomfort \"cramping/sharp\"/nonradiating/associated nausea, vomiting, diarrhea, chills.  ROS neg dysuria/fever/chest pain/soa/hematemesis/melena/hematochoezia.        History provided by:  Patient  Abdominal Pain   Pain location:  LLQ  Pain quality: cramping and sharp    Pain radiates to:  Does not radiate  Pain severity:  Moderate  Onset quality:  Sudden  Duration:  1 day  Chronicity:  New  Associated symptoms: chills, diarrhea and nausea    Associated symptoms: no fever        Review of Systems   Constitutional: Positive for chills. Negative for fever.   HENT: Negative.    Respiratory: Negative.    Cardiovascular: Negative.    Gastrointestinal: Positive for abdominal pain, diarrhea and nausea. Negative for blood in stool.   Musculoskeletal: Negative.    Skin: Negative.    All other systems reviewed and are negative.      Past Medical History:   Diagnosis Date   • Chest pain    • Dehydration    • Dizziness    • Hypertension        No Known Allergies    Past Surgical History:   Procedure Laterality Date   • APPENDECTOMY     • LEG SURGERY         Family History   Problem Relation Age of Onset   • Heart disease Mother    • Diabetes Mother    • Lung cancer Father    • Leukemia Sister    • Diabetes Sister    • Diabetes Brother        Social History     Socioeconomic History   • Marital status:      Spouse name: Not on file   • Number of children: Not on file   • Years of education: Not on file   • Highest education level: Not on file   Tobacco Use   • Smoking status: Current Every Day Smoker     Packs/day: 0.50     Types: Cigarettes   • Smokeless tobacco: Never Used   Substance and Sexual Activity   • Alcohol use: No     Frequency: Never     Comment: \"very little\"   • Drug use: No   • Sexual activity: Defer           Objective   Physical Exam   Constitutional: He is oriented to person, " place, and time. He appears well-developed and well-nourished.   HENT:   Head: Normocephalic and atraumatic.   Mouth/Throat: Oropharynx is clear and moist.   Eyes: Pupils are equal, round, and reactive to light.   Neck: Neck supple. No JVD present. No tracheal deviation present.   Cardiovascular: Normal rate, regular rhythm, normal heart sounds and intact distal pulses. Exam reveals no gallop and no friction rub.   No murmur heard.  Pulmonary/Chest: Effort normal and breath sounds normal. He has no wheezes. He has no rales.   Abdominal: Soft. Bowel sounds are normal. There is tenderness in the suprapubic area and left lower quadrant. There is no rigidity, no rebound, no guarding, no CVA tenderness and negative Golden's sign. Hernia confirmed negative in the right inguinal area and confirmed negative in the left inguinal area.       Genitourinary: Rectum normal, testes normal and penis normal. Rectal exam shows no tenderness and guaiac negative stool. Prostate is not enlarged and not tender. Right testis shows no mass and no tenderness. Left testis shows no mass and no tenderness.   Musculoskeletal: Normal range of motion.   Lymphadenopathy:     He has no cervical adenopathy. No inguinal adenopathy noted on the right or left side.   Neurological: He is alert and oriented to person, place, and time.   Nursing note and vitals reviewed.      ECG 12 Lead    Date/Time: 3/8/2019 12:20 AM  Performed by: Roland Brown MD  Authorized by: Roland Brown MD   Interpreted by physician  Rhythm: sinus rhythm  Rate: normal  BPM: 91  QRS axis: normal  Conduction: conduction normal  ST Segments: ST segments normal  T Waves: T waves normal  Other: no other findings  Clinical impression: normal ECG                 ED Course      Labs Reviewed   COMPREHENSIVE METABOLIC PANEL - Abnormal; Notable for the following components:       Result Value    Glucose 108 (*)     BUN 28 (*)     Sodium 132 (*)     BUN/Creatinine Ratio 25.5 (*)      All other components within normal limits   CBC WITH AUTO DIFFERENTIAL - Abnormal; Notable for the following components:    MPV 12.6 (*)     Neutrophil % 86.1 (*)     Lymphocyte % 7.5 (*)     Neutrophils, Absolute 8.46 (*)     All other components within normal limits   LIPASE - Normal   URINALYSIS W/ MICROSCOPIC IF INDICATED (NO CULTURE) - Normal    Narrative:     Urine microscopic not indicated.   CBC AND DIFFERENTIAL    Narrative:     The following orders were created for panel order CBC & Differential.  Procedure                               Abnormality         Status                     ---------                               -----------         ------                     CBC Auto Differential[[id number]]        Abnormal            Final result                 Please view results for these tests on the individual orders.   EXTRA TUBES    Narrative:     The following orders were created for panel order Extra Tubes.  Procedure                               Abnormality         Status                     ---------                               -----------         ------                     Light Blue Top[[id number]]                                   Final result               Gold Top - SST[[id number]]                                   Final result                 Please view results for these tests on the individual orders.   LIGHT BLUE TOP   GOLD TOP - SST     Xr Abdomen 2 View With Chest 1 View    Result Date: 3/7/2019  Narrative: [facility] [place] Patient Name: DANIELA NICHOLAS ATTENDING: ECTOR MARTINEZ REFERRING: ECTOR MARTINEZ ORDERING: ECTOR MARTINEZ ----------------------- PROCEDURE: Abdomen series with chest DATE OF EXAM: 3/7/2019 HISTORY: Abdomen pain Supine and erect images of the abdomen were obtained. Study is compared to prior exam of 5/31/2010. The bowel gas pattern is unremarkable. There is no evidence of significant distention or obstruction. No free air is seen. No mass lesions or abnormal  calcifications are appreciated. Surgical clips are seen in the right mid abdomen. Accompanying erect view of the chest shows the lungs well expanded and clear of infiltrates or effusions. The cardiac silhouette is normal. No free intraperitoneal air is seen. No change from chest of 12/7/2018.     Impression: 1. Unremarkable abdomen series. No acute abnormalities are  seen. 2. Single view of the chest shows the lungs clear bilaterally. No active cardiopulmonary disease. Electronically signed by:  Tariq Augustin MD  3/7/2019 9:13 PM CST Workstation: [employer]    Ct Abdomen Pelvis With Contrast    Result Date: 3/7/2019  Narrative: Exam: CT abdomen pelvis with contrast History abdominal pain TECHNIQUE: Routine CT abdomen pelvis with contrast. Sagittal coronal reconstructions were obtained. This exam was performed according to our departmental dose-optimization program, which includes automated exposure control, adjustment of the mA and/or kV according to patient size and/or use of iterative reconstruction technique. FINDINGS: The imaged portions of the lower lungs are clear. The liver, spleen, pancreas kidneys adrenals and gallbladder are unremarkable. No urinary tract calculus or hydronephrosis. The aorta is normal in caliber. Plaque is present in the aorta.. No significant adenopathy. No bowel obstruction or abnormal bowel wall thickening. There are surgical clips present at the base of the cecum. Appendix is not seen. No abnormal stranding in the mesentery or ascites Bladder is unremarkable. Thoracolumbar spondylosis is present.     Impression: No obvious acute abnormality. Electronically signed by:  Vikram Strange MD  3/7/2019 11:21 PM CST Workstation: [id number]                MDM      Final diagnoses:   Left lower quadrant pain   Vomiting and diarrhea            Roland Brown MD  03/08/19 0020

## 2019-03-08 NOTE — ED NOTES
Patient presents to ED with complaint of lower abdominal pain. He states it started this morning and has persisted and he comes tonight due to vomiting and the pain.      Aspen Nunez RN  03/07/19 6838

## 2019-09-29 ENCOUNTER — APPOINTMENT (OUTPATIENT)
Dept: GENERAL RADIOLOGY | Facility: HOSPITAL | Age: 54
End: 2019-09-29

## 2019-09-29 ENCOUNTER — HOSPITAL ENCOUNTER (EMERGENCY)
Facility: HOSPITAL | Age: 54
Discharge: HOME | End: 2019-09-29
Attending: EMERGENCY MEDICINE | Admitting: EMERGENCY MEDICINE

## 2019-09-29 VITALS
HEART RATE: 63 BPM | OXYGEN SATURATION: 98 % | HEIGHT: 72 IN | SYSTOLIC BLOOD PRESSURE: 138 MMHG | DIASTOLIC BLOOD PRESSURE: 82 MMHG | RESPIRATION RATE: 16 BRPM | BODY MASS INDEX: 29.53 KG/M2 | TEMPERATURE: 98 F | WEIGHT: 218 LBS

## 2019-09-29 DIAGNOSIS — S60.111A CONTUSION OF RIGHT THUMB WITH DAMAGE TO NAIL, INITIAL ENCOUNTER: Primary | ICD-10-CM

## 2019-09-29 DIAGNOSIS — S60.10XA SUBUNGUAL HEMATOMA OF DIGIT OF HAND, INITIAL ENCOUNTER: ICD-10-CM

## 2019-09-29 PROCEDURE — 99283 EMERGENCY DEPT VISIT LOW MDM: CPT

## 2019-09-29 PROCEDURE — 73140 X-RAY EXAM OF FINGER(S): CPT

## 2019-10-19 ENCOUNTER — HOSPITAL ENCOUNTER (EMERGENCY)
Facility: HOSPITAL | Age: 54
Discharge: HOME | End: 2019-10-19
Attending: FAMILY MEDICINE | Admitting: FAMILY MEDICINE

## 2019-10-19 VITALS
BODY MASS INDEX: 28.42 KG/M2 | OXYGEN SATURATION: 98 % | DIASTOLIC BLOOD PRESSURE: 83 MMHG | TEMPERATURE: 97.4 F | HEART RATE: 52 BPM | RESPIRATION RATE: 16 BRPM | SYSTOLIC BLOOD PRESSURE: 160 MMHG | WEIGHT: 209.8 LBS | HEIGHT: 72 IN

## 2019-10-19 DIAGNOSIS — R10.13 EPIGASTRIC PAIN: Primary | ICD-10-CM

## 2019-10-19 DIAGNOSIS — R11.2 NON-INTRACTABLE VOMITING WITH NAUSEA, UNSPECIFIED VOMITING TYPE: ICD-10-CM

## 2019-10-19 DIAGNOSIS — K21.9 GASTROESOPHAGEAL REFLUX DISEASE, ESOPHAGITIS PRESENCE NOT SPECIFIED: ICD-10-CM

## 2019-10-19 LAB
ALBUMIN SERPL-MCNC: 4.3 G/DL (ref 3.5–5.2)
ALBUMIN/GLOB SERPL: 1.8 G/DL
ALP SERPL-CCNC: 66 U/L (ref 39–117)
ALT SERPL W P-5'-P-CCNC: 22 U/L (ref 1–41)
ANION GAP SERPL CALCULATED.3IONS-SCNC: 11 MMOL/L (ref 5–15)
AST SERPL-CCNC: 16 U/L (ref 1–40)
BASOPHILS # BLD AUTO: 0.04 10*3/MM3 (ref 0–0.2)
BASOPHILS NFR BLD AUTO: 0.5 % (ref 0–1.5)
BILIRUB SERPL-MCNC: 0.5 MG/DL (ref 0.2–1.2)
BILIRUB UR QL STRIP: NEGATIVE
BUN BLD-MCNC: 13 MG/DL (ref 6–20)
BUN/CREAT SERPL: 13.1 (ref 7–25)
CALCIUM SPEC-SCNC: 8.9 MG/DL (ref 8.6–10.5)
CHLORIDE SERPL-SCNC: 107 MMOL/L (ref 98–107)
CK SERPL-CCNC: 94 U/L (ref 20–200)
CLARITY UR: CLEAR
CO2 SERPL-SCNC: 22 MMOL/L (ref 22–29)
COLOR UR: YELLOW
CREAT BLD-MCNC: 0.99 MG/DL (ref 0.76–1.27)
DEPRECATED RDW RBC AUTO: 43.7 FL (ref 37–54)
EOSINOPHIL # BLD AUTO: 0.14 10*3/MM3 (ref 0–0.4)
EOSINOPHIL NFR BLD AUTO: 1.9 % (ref 0.3–6.2)
ERYTHROCYTE [DISTWIDTH] IN BLOOD BY AUTOMATED COUNT: 13.6 % (ref 12.3–15.4)
FLUAV AG NPH QL: NEGATIVE
FLUBV AG NPH QL IA: NEGATIVE
GFR SERPL CREATININE-BSD FRML MDRD: 80 ML/MIN/1.73
GLOBULIN UR ELPH-MCNC: 2.4 GM/DL
GLUCOSE BLD-MCNC: 109 MG/DL (ref 65–99)
GLUCOSE UR STRIP-MCNC: NEGATIVE MG/DL
HCT VFR BLD AUTO: 39.6 % (ref 37.5–51)
HGB BLD-MCNC: 13.9 G/DL (ref 13–17.7)
HGB UR QL STRIP.AUTO: NEGATIVE
HOLD SPECIMEN: NORMAL
HOLD SPECIMEN: NORMAL
IMM GRANULOCYTES # BLD AUTO: 0.01 10*3/MM3 (ref 0–0.05)
IMM GRANULOCYTES NFR BLD AUTO: 0.1 % (ref 0–0.5)
KETONES UR QL STRIP: NEGATIVE
LEUKOCYTE ESTERASE UR QL STRIP.AUTO: NEGATIVE
LIPASE SERPL-CCNC: 37 U/L (ref 13–60)
LYMPHOCYTES # BLD AUTO: 2.13 10*3/MM3 (ref 0.7–3.1)
LYMPHOCYTES NFR BLD AUTO: 29.3 % (ref 19.6–45.3)
MAGNESIUM SERPL-MCNC: 1.9 MG/DL (ref 1.6–2.6)
MCH RBC QN AUTO: 30.8 PG (ref 26.6–33)
MCHC RBC AUTO-ENTMCNC: 35.1 G/DL (ref 31.5–35.7)
MCV RBC AUTO: 87.6 FL (ref 79–97)
MONOCYTES # BLD AUTO: 0.77 10*3/MM3 (ref 0.1–0.9)
MONOCYTES NFR BLD AUTO: 10.6 % (ref 5–12)
NEUTROPHILS # BLD AUTO: 4.19 10*3/MM3 (ref 1.7–7)
NEUTROPHILS NFR BLD AUTO: 57.6 % (ref 42.7–76)
NITRITE UR QL STRIP: NEGATIVE
NRBC BLD AUTO-RTO: 0 /100 WBC (ref 0–0.2)
PH UR STRIP.AUTO: 6.5 [PH] (ref 5–9)
PLATELET # BLD AUTO: 156 10*3/MM3 (ref 140–450)
PMV BLD AUTO: 12.9 FL (ref 6–12)
POTASSIUM BLD-SCNC: 4 MMOL/L (ref 3.5–5.2)
PROT SERPL-MCNC: 6.7 G/DL (ref 6–8.5)
PROT UR QL STRIP: NEGATIVE
RBC # BLD AUTO: 4.52 10*6/MM3 (ref 4.14–5.8)
SODIUM BLD-SCNC: 140 MMOL/L (ref 136–145)
SP GR UR STRIP: 1.02 (ref 1–1.03)
UROBILINOGEN UR QL STRIP: NORMAL
WBC NRBC COR # BLD: 7.28 10*3/MM3 (ref 3.4–10.8)
WHOLE BLOOD HOLD SPECIMEN: NORMAL
WHOLE BLOOD HOLD SPECIMEN: NORMAL

## 2019-10-19 PROCEDURE — 83735 ASSAY OF MAGNESIUM: CPT | Performed by: FAMILY MEDICINE

## 2019-10-19 PROCEDURE — 80053 COMPREHEN METABOLIC PANEL: CPT | Performed by: FAMILY MEDICINE

## 2019-10-19 PROCEDURE — 96374 THER/PROPH/DIAG INJ IV PUSH: CPT

## 2019-10-19 PROCEDURE — 25010000002 ONDANSETRON PER 1 MG: Performed by: FAMILY MEDICINE

## 2019-10-19 PROCEDURE — 99283 EMERGENCY DEPT VISIT LOW MDM: CPT

## 2019-10-19 PROCEDURE — 99284 EMERGENCY DEPT VISIT MOD MDM: CPT

## 2019-10-19 PROCEDURE — 81003 URINALYSIS AUTO W/O SCOPE: CPT

## 2019-10-19 PROCEDURE — 82550 ASSAY OF CK (CPK): CPT | Performed by: FAMILY MEDICINE

## 2019-10-19 PROCEDURE — 85025 COMPLETE CBC W/AUTO DIFF WBC: CPT | Performed by: FAMILY MEDICINE

## 2019-10-19 PROCEDURE — 87804 INFLUENZA ASSAY W/OPTIC: CPT | Performed by: FAMILY MEDICINE

## 2019-10-19 PROCEDURE — 83690 ASSAY OF LIPASE: CPT | Performed by: FAMILY MEDICINE

## 2019-10-19 RX ORDER — ONDANSETRON 2 MG/ML
4 INJECTION INTRAMUSCULAR; INTRAVENOUS ONCE
Status: COMPLETED | OUTPATIENT
Start: 2019-10-19 | End: 2019-10-19

## 2019-10-19 RX ORDER — PROMETHAZINE HYDROCHLORIDE 25 MG/1
25 TABLET ORAL EVERY 6 HOURS PRN
Qty: 15 TABLET | Refills: 0 | OUTPATIENT
Start: 2019-10-19 | End: 2020-03-12

## 2019-10-19 RX ORDER — OMEPRAZOLE 20 MG/1
20 CAPSULE, DELAYED RELEASE ORAL DAILY
Qty: 30 CAPSULE | Refills: 0 | OUTPATIENT
Start: 2019-10-19 | End: 2020-03-12

## 2019-10-19 RX ADMIN — ONDANSETRON 4 MG: 2 INJECTION INTRAMUSCULAR; INTRAVENOUS at 13:52

## 2019-10-19 RX ADMIN — SODIUM CHLORIDE 1000 ML: 9 INJECTION, SOLUTION INTRAVENOUS at 13:53

## 2019-10-19 NOTE — ED PROVIDER NOTES
Subjective   Patient developed nausea vomiting and chills 3 days ago.  He was able to eat at [employer] yesterday.  He admits to being in contact with some other sick people, denies abdominal pain.  Patient states that the nausea vomiting occurs when he lays down to sleep at night.  He normally takes medication for gastroesophageal reflux but has been unable to fill his medications secondary to lack of insurance.        Vomiting   The primary symptoms include nausea and vomiting. Primary symptoms do not include fever, abdominal pain, diarrhea, myalgias or rash. The illness began 3 to 5 days ago.   Significant associated medical issues include GERD.       Review of Systems   Constitutional: Negative for appetite change, diaphoresis and fever.   HENT: Negative for congestion, ear discharge, ear pain, nosebleeds, rhinorrhea, sinus pressure and trouble swallowing.    Eyes: Negative for discharge and redness.   Respiratory: Negative for apnea, chest tightness and wheezing.    Gastrointestinal: Positive for nausea and vomiting. Negative for abdominal pain and diarrhea.   Endocrine: Negative for polyuria.   Genitourinary: Negative for frequency and urgency.   Musculoskeletal: Negative for myalgias and neck pain.   Skin: Negative for color change and rash.   Allergic/Immunologic: Negative for immunocompromised state.   Neurological: Negative for dizziness, seizures, syncope, weakness, light-headedness and headaches.   Hematological: Negative for adenopathy. Does not bruise/bleed easily.   Psychiatric/Behavioral: Negative for behavioral problems and confusion.   All other systems reviewed and are negative.      Past Medical History:   Diagnosis Date   • Chest pain    • Dehydration    • Dizziness    • Hypertension        No Known Allergies    Past Surgical History:   Procedure Laterality Date   • APPENDECTOMY     • LEG SURGERY         Family History   Problem Relation Age of Onset   • Heart disease Mother    • Diabetes Mother   "  • Lung cancer Father    • Leukemia Sister    • Diabetes Sister    • Diabetes Brother        Social History     Socioeconomic History   • Marital status:      Spouse name: Not on file   • Number of children: Not on file   • Years of education: Not on file   • Highest education level: Not on file   Tobacco Use   • Smoking status: Current Every Day Smoker     Packs/day: 0.50     Types: Cigarettes   • Smokeless tobacco: Never Used   Substance and Sexual Activity   • Alcohol use: No     Frequency: Never     Comment: \"very little\"   • Drug use: No   • Sexual activity: Defer           Objective   Physical Exam   Constitutional: He is oriented to person, place, and time. He appears well-developed and well-nourished.   HENT:   Head: Normocephalic and atraumatic.   Nose: Nose normal.   Mouth/Throat: Oropharynx is clear and moist.   Eyes: Conjunctivae and EOM are normal. Pupils are equal, round, and reactive to light. Right eye exhibits no discharge. Left eye exhibits no discharge. No scleral icterus.   Neck: Normal range of motion. Neck supple. No tracheal deviation present.   Cardiovascular: Normal rate, regular rhythm and normal heart sounds.   No murmur heard.  Pulmonary/Chest: Effort normal and breath sounds normal. No stridor. No respiratory distress. He has no wheezes. He has no rales.   Abdominal: Soft. Bowel sounds are normal. He exhibits no distension and no mass. There is no tenderness. There is no rebound and no guarding.   Musculoskeletal: He exhibits no edema.   Neurological: He is alert and oriented to person, place, and time. Coordination normal.   Skin: Skin is warm and dry. No rash noted. No erythema.   Psychiatric: He has a normal mood and affect. His behavior is normal. Thought content normal.   Nursing note and vitals reviewed.      Procedures           ED Course          Labs Reviewed   COMPREHENSIVE METABOLIC PANEL - Abnormal; Notable for the following components:       Result Value    Glucose " 109 (*)     All other components within normal limits    Narrative:     GFR Normal >60  Chronic Kidney Disease <60  Kidney Failure <15   CBC WITH AUTO DIFFERENTIAL - Abnormal; Notable for the following components:    MPV 12.9 (*)     All other components within normal limits   INFLUENZA ANTIGEN, RAPID - Normal   URINALYSIS W/ CULTURE IF INDICATED - Normal    Narrative:     Urine microscopic not indicated.   CK - Normal   MAGNESIUM - Normal   LIPASE - Normal   RAINBOW DRAW    Narrative:     The following orders were created for panel order [place] Draw.  Procedure                               Abnormality         Status                     ---------                               -----------         ------                     Light Blue Top[[id number]]                                   Final result               Green Top (Gel)[[id number]]                                  Final result               Lavender Top[[id number]]                                     Final result               Gold Top - SST[[id number]]                                   Final result                 Please view results for these tests on the individual orders.   LIGHT BLUE TOP   GREEN TOP   LAVENDER TOP   GOLD TOP - SST   CBC AND DIFFERENTIAL    Narrative:     The following orders were created for panel order CBC & Differential.  Procedure                               Abnormality         Status                     ---------                               -----------         ------                     CBC Auto Differential[[id number]]        Abnormal            Final result                 Please view results for these tests on the individual orders.       No orders to display                 MDM    Final diagnoses:   Epigastric pain   Gastroesophageal reflux disease, esophagitis presence not specified   Non-intractable vomiting with nausea, unspecified vomiting type              Rip Lai MD  10/19/19 6779

## 2019-12-08 ENCOUNTER — HOSPITAL ENCOUNTER (EMERGENCY)
Facility: HOSPITAL | Age: 54
Discharge: HOME | End: 2019-12-08
Attending: EMERGENCY MEDICINE | Admitting: EMERGENCY MEDICINE

## 2019-12-08 ENCOUNTER — APPOINTMENT (OUTPATIENT)
Dept: GENERAL RADIOLOGY | Facility: HOSPITAL | Age: 54
End: 2019-12-08

## 2019-12-08 VITALS
RESPIRATION RATE: 20 BRPM | WEIGHT: 213.7 LBS | HEIGHT: 71 IN | DIASTOLIC BLOOD PRESSURE: 94 MMHG | OXYGEN SATURATION: 97 % | SYSTOLIC BLOOD PRESSURE: 145 MMHG | HEART RATE: 72 BPM | BODY MASS INDEX: 29.92 KG/M2 | TEMPERATURE: 97.8 F

## 2019-12-08 DIAGNOSIS — M77.9 TENDINITIS: Primary | ICD-10-CM

## 2019-12-08 PROCEDURE — 73130 X-RAY EXAM OF HAND: CPT

## 2019-12-08 PROCEDURE — 99283 EMERGENCY DEPT VISIT LOW MDM: CPT

## 2019-12-08 RX ORDER — IBUPROFEN 600 MG/1
600 TABLET ORAL EVERY 6 HOURS PRN
Qty: 30 TABLET | Refills: 0 | Status: SHIPPED | OUTPATIENT
Start: 2019-12-08

## 2019-12-08 RX ORDER — IBUPROFEN 600 MG/1
600 TABLET ORAL ONCE
Status: COMPLETED | OUTPATIENT
Start: 2019-12-08 | End: 2019-12-08

## 2019-12-08 RX ADMIN — IBUPROFEN 600 MG: 600 TABLET ORAL at 14:34

## 2019-12-08 NOTE — ED PROVIDER NOTES
"Subjective   54-year-old male presents with left thumb pain, swelling, tingling for past 3 days.  Patient reports pain with movement of his thumb.  Reports burning type pain distally.  Denies any injury that he knows of.  Reports he uses his hands a lot at home doing  jobs.  Denies any history of diabetes.          Review of Systems   Constitutional: Negative for chills and fever.   HENT: Negative for voice change.    Eyes: Negative for pain, redness and visual disturbance.   Respiratory: Negative for cough, shortness of breath and wheezing.    Cardiovascular: Negative for chest pain and palpitations.   Gastrointestinal: Negative for abdominal pain, constipation, diarrhea, nausea and vomiting.   Genitourinary: Negative for dysuria and hematuria.   Musculoskeletal: Positive for arthralgias. Negative for back pain, joint swelling, myalgias, neck pain and neck stiffness.   Skin: Negative for pallor and rash.   Neurological: Negative for dizziness, syncope and headaches.       Past Medical History:   Diagnosis Date   • Chest pain    • Dehydration    • Dizziness    • Hypertension        No Known Allergies    Past Surgical History:   Procedure Laterality Date   • APPENDECTOMY     • LEG SURGERY         Family History   Problem Relation Age of Onset   • Heart disease Mother    • Diabetes Mother    • Lung cancer Father    • Leukemia Sister    • Diabetes Sister    • Diabetes Brother        Social History     Socioeconomic History   • Marital status:      Spouse name: Not on file   • Number of children: Not on file   • Years of education: Not on file   • Highest education level: Not on file   Tobacco Use   • Smoking status: Current Every Day Smoker     Packs/day: 0.50     Types: Cigarettes   • Smokeless tobacco: Never Used   Substance and Sexual Activity   • Alcohol use: No     Frequency: Never     Comment: \"very little\"   • Drug use: No   • Sexual activity: Defer           Objective   Physical Exam "   Constitutional: He is oriented to person, place, and time. He appears well-developed and well-nourished. No distress.   HENT:   Head: Normocephalic and atraumatic.   Eyes: Pupils are equal, round, and reactive to light.   Neck: Normal range of motion. Neck supple.   Cardiovascular: Normal rate and regular rhythm.   No murmur heard.  Pulmonary/Chest: Effort normal and breath sounds normal.   Abdominal: Soft. Bowel sounds are normal. There is no tenderness.   Musculoskeletal: Normal range of motion. He exhibits tenderness. He exhibits no edema or deformity.   Mild pain with passive range of motion of the left first CP joint.  Mild tenderness of the palmar thumb.  Normal sensation.  Able to flex and extend normally.   Neurological: He is alert and oriented to person, place, and time. No sensory deficit.   Skin: Skin is warm and dry. Capillary refill takes less than 2 seconds. No rash noted. No erythema.       Procedures           ED Course                      No data recorded                        MDM  Number of Diagnoses or Management Options  Diagnosis management comments: Patient with likely tendinitis of the thumb, will place in thumb spica splint, treat with rest and analgesics, refer to orthopedics       Amount and/or Complexity of Data Reviewed  Clinical lab tests: reviewed  Tests in the radiology section of CPT®: reviewed  Tests in the medicine section of CPT®: reviewed    Patient Progress  Patient progress: stable      Final diagnoses:   Tendinitis              Jossue Jones MD  12/08/19 1444       Jossue Jones MD  12/13/19 2265

## 2019-12-08 NOTE — ED NOTES
Patient's left thumb has been hurting for 2-3 days now. Patient denies any trauma or injury to the thumb, but states the pain is excruciating at times, tingles off and on and also reports numbness at random times. Patient reports a pain level of 4/10 at this time. No pain meds taken PTA according to the patient.     Shivani Booth, RN  12/08/19 5652

## 2020-03-12 ENCOUNTER — APPOINTMENT (OUTPATIENT)
Dept: GENERAL RADIOLOGY | Facility: HOSPITAL | Age: 55
End: 2020-03-12

## 2020-03-12 ENCOUNTER — HOSPITAL ENCOUNTER (EMERGENCY)
Facility: HOSPITAL | Age: 55
Discharge: HOME | End: 2020-03-12
Attending: EMERGENCY MEDICINE | Admitting: EMERGENCY MEDICINE

## 2020-03-12 VITALS
WEIGHT: 215 LBS | TEMPERATURE: 97.5 F | HEART RATE: 66 BPM | DIASTOLIC BLOOD PRESSURE: 84 MMHG | SYSTOLIC BLOOD PRESSURE: 134 MMHG | BODY MASS INDEX: 29.99 KG/M2 | OXYGEN SATURATION: 98 % | RESPIRATION RATE: 18 BRPM

## 2020-03-12 DIAGNOSIS — J06.9 UPPER RESPIRATORY TRACT INFECTION, UNSPECIFIED TYPE: Primary | ICD-10-CM

## 2020-03-12 DIAGNOSIS — J20.9 ACUTE BRONCHITIS, UNSPECIFIED ORGANISM: ICD-10-CM

## 2020-03-12 LAB
ALBUMIN SERPL-MCNC: 4.2 G/DL (ref 3.5–5.2)
ALBUMIN/GLOB SERPL: 1.6 G/DL
ALP SERPL-CCNC: 85 U/L (ref 39–117)
ALT SERPL W P-5'-P-CCNC: 19 U/L (ref 1–41)
ANION GAP SERPL CALCULATED.3IONS-SCNC: 13 MMOL/L (ref 5–15)
AST SERPL-CCNC: 14 U/L (ref 1–40)
BASOPHILS # BLD AUTO: 0.05 10*3/MM3 (ref 0–0.2)
BASOPHILS NFR BLD AUTO: 0.8 % (ref 0–1.5)
BILIRUB SERPL-MCNC: 0.4 MG/DL (ref 0.2–1.2)
BUN BLD-MCNC: 12 MG/DL (ref 6–20)
BUN/CREAT SERPL: 13.2 (ref 7–25)
CALCIUM SPEC-SCNC: 9 MG/DL (ref 8.6–10.5)
CHLORIDE SERPL-SCNC: 108 MMOL/L (ref 98–107)
CO2 SERPL-SCNC: 20 MMOL/L (ref 22–29)
CREAT BLD-MCNC: 0.91 MG/DL (ref 0.76–1.27)
DEPRECATED RDW RBC AUTO: 40 FL (ref 37–54)
EOSINOPHIL # BLD AUTO: 0.11 10*3/MM3 (ref 0–0.4)
EOSINOPHIL NFR BLD AUTO: 1.7 % (ref 0.3–6.2)
ERYTHROCYTE [DISTWIDTH] IN BLOOD BY AUTOMATED COUNT: 12.9 % (ref 12.3–15.4)
FLUAV AG NPH QL: NEGATIVE
FLUBV AG NPH QL IA: NEGATIVE
GFR SERPL CREATININE-BSD FRML MDRD: 87 ML/MIN/1.73
GLOBULIN UR ELPH-MCNC: 2.7 GM/DL
GLUCOSE BLD-MCNC: 116 MG/DL (ref 65–99)
HCT VFR BLD AUTO: 39.1 % (ref 37.5–51)
HGB BLD-MCNC: 13.8 G/DL (ref 13–17.7)
HOLD SPECIMEN: NORMAL
IMM GRANULOCYTES # BLD AUTO: 0.02 10*3/MM3 (ref 0–0.05)
IMM GRANULOCYTES NFR BLD AUTO: 0.3 % (ref 0–0.5)
LYMPHOCYTES # BLD AUTO: 1.73 10*3/MM3 (ref 0.7–3.1)
LYMPHOCYTES NFR BLD AUTO: 26.6 % (ref 19.6–45.3)
MCH RBC QN AUTO: 30.3 PG (ref 26.6–33)
MCHC RBC AUTO-ENTMCNC: 35.3 G/DL (ref 31.5–35.7)
MCV RBC AUTO: 85.9 FL (ref 79–97)
MONOCYTES # BLD AUTO: 0.64 10*3/MM3 (ref 0.1–0.9)
MONOCYTES NFR BLD AUTO: 9.8 % (ref 5–12)
NEUTROPHILS # BLD AUTO: 3.96 10*3/MM3 (ref 1.7–7)
NEUTROPHILS NFR BLD AUTO: 60.8 % (ref 42.7–76)
NRBC BLD AUTO-RTO: 0 /100 WBC (ref 0–0.2)
PLATELET # BLD AUTO: 189 10*3/MM3 (ref 140–450)
PMV BLD AUTO: 12.3 FL (ref 6–12)
POTASSIUM BLD-SCNC: 3.9 MMOL/L (ref 3.5–5.2)
PROT SERPL-MCNC: 6.9 G/DL (ref 6–8.5)
RBC # BLD AUTO: 4.55 10*6/MM3 (ref 4.14–5.8)
SODIUM BLD-SCNC: 141 MMOL/L (ref 136–145)
TROPONIN T SERPL-MCNC: <0.01 NG/ML (ref 0–0.03)
WBC NRBC COR # BLD: 6.51 10*3/MM3 (ref 3.4–10.8)
WHOLE BLOOD HOLD SPECIMEN: NORMAL

## 2020-03-12 PROCEDURE — 85025 COMPLETE CBC W/AUTO DIFF WBC: CPT | Performed by: NURSE PRACTITIONER

## 2020-03-12 PROCEDURE — 84484 ASSAY OF TROPONIN QUANT: CPT | Performed by: NURSE PRACTITIONER

## 2020-03-12 PROCEDURE — 25010000002 KETOROLAC TROMETHAMINE PER 15 MG: Performed by: NURSE PRACTITIONER

## 2020-03-12 PROCEDURE — 25010000002 ONDANSETRON PER 1 MG: Performed by: NURSE PRACTITIONER

## 2020-03-12 PROCEDURE — 96375 TX/PRO/DX INJ NEW DRUG ADDON: CPT

## 2020-03-12 PROCEDURE — 93010 ELECTROCARDIOGRAM REPORT: CPT | Performed by: INTERNAL MEDICINE

## 2020-03-12 PROCEDURE — 80053 COMPREHEN METABOLIC PANEL: CPT | Performed by: NURSE PRACTITIONER

## 2020-03-12 PROCEDURE — 99284 EMERGENCY DEPT VISIT MOD MDM: CPT

## 2020-03-12 PROCEDURE — 71046 X-RAY EXAM CHEST 2 VIEWS: CPT

## 2020-03-12 PROCEDURE — 93005 ELECTROCARDIOGRAM TRACING: CPT | Performed by: EMERGENCY MEDICINE

## 2020-03-12 PROCEDURE — 96374 THER/PROPH/DIAG INJ IV PUSH: CPT

## 2020-03-12 PROCEDURE — 87804 INFLUENZA ASSAY W/OPTIC: CPT | Performed by: NURSE PRACTITIONER

## 2020-03-12 RX ORDER — AZITHROMYCIN 250 MG/1
250 TABLET, FILM COATED ORAL DAILY
Qty: 6 TABLET | Refills: 0 | Status: ON HOLD | OUTPATIENT
Start: 2020-03-12 | End: 2020-03-23

## 2020-03-12 RX ORDER — BENZONATATE 200 MG/1
200 CAPSULE ORAL 3 TIMES DAILY PRN
Qty: 15 CAPSULE | Refills: 0 | Status: ON HOLD | OUTPATIENT
Start: 2020-03-12 | End: 2020-03-23

## 2020-03-12 RX ORDER — ONDANSETRON 2 MG/ML
4 INJECTION INTRAMUSCULAR; INTRAVENOUS ONCE
Status: COMPLETED | OUTPATIENT
Start: 2020-03-12 | End: 2020-03-12

## 2020-03-12 RX ORDER — KETOROLAC TROMETHAMINE 30 MG/ML
30 INJECTION, SOLUTION INTRAMUSCULAR; INTRAVENOUS ONCE
Status: COMPLETED | OUTPATIENT
Start: 2020-03-12 | End: 2020-03-12

## 2020-03-12 RX ORDER — BENZONATATE 100 MG/1
200 CAPSULE ORAL 3 TIMES DAILY PRN
Status: DISCONTINUED | OUTPATIENT
Start: 2020-03-12 | End: 2020-03-12 | Stop reason: HOSPADM

## 2020-03-12 RX ORDER — SODIUM CHLORIDE 0.9 % (FLUSH) 0.9 %
10 SYRINGE (ML) INJECTION AS NEEDED
Status: DISCONTINUED | OUTPATIENT
Start: 2020-03-12 | End: 2020-03-12 | Stop reason: HOSPADM

## 2020-03-12 RX ADMIN — ONDANSETRON 4 MG: 2 INJECTION INTRAMUSCULAR; INTRAVENOUS at 12:27

## 2020-03-12 RX ADMIN — BENZONATATE 200 MG: 100 CAPSULE ORAL at 12:32

## 2020-03-12 RX ADMIN — SODIUM CHLORIDE 1000 ML: 9 INJECTION, SOLUTION INTRAVENOUS at 12:26

## 2020-03-12 RX ADMIN — KETOROLAC TROMETHAMINE 30 MG: 30 INJECTION, SOLUTION INTRAMUSCULAR; INTRAVENOUS at 12:27

## 2020-03-12 NOTE — ED PROVIDER NOTES
"Subjective   Patient presents to the ER with c/o cough that started on Monday. He states since then his cough has progressively gotten worse and is not intermittently productive. He states he had a low grade fever of 99 something earlier but none today. He also states he has been having some N/V/D with this - none today. Medications include Tylenol on Monday but none since. He does state the coughing has made his back and chest \"sore\". Denies CP outside of cough. 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           Review of Systems   Constitutional: Positive for appetite change, chills, fatigue and fever. Negative for diaphoresis.   HENT: Negative for congestion, ear pain and sore throat.    Eyes: Negative for redness.   Respiratory: Positive for cough and shortness of breath. Negative for chest tightness and wheezing.    Cardiovascular: Positive for chest pain (tender only with cough). Negative for palpitations.   Gastrointestinal: Positive for diarrhea, nausea and vomiting. Negative for abdominal pain, blood in stool and constipation.   Genitourinary: Negative.    Musculoskeletal: Positive for back pain (sore with cough).   Skin: Negative.    Neurological: Negative.    Psychiatric/Behavioral: Negative.        Past Medical History:   Diagnosis Date   • Chest pain    • Dehydration    • Dizziness    • Hypertension        No Known Allergies    Past Surgical History:   Procedure Laterality Date   • APPENDECTOMY     • LEG SURGERY         Family History   Problem Relation Age of Onset   • Heart disease Mother    • Diabetes Mother    • Lung cancer Father    • Leukemia Sister    • Diabetes Sister    • Diabetes Brother        Social History     Socioeconomic History   • Marital status:      Spouse name: Not on file   • Number of children: Not on file   • Years of education: Not on file   • Highest education level: Not on file   Tobacco Use   • Smoking status: Current Every Day Smoker     Packs/day: 0.50 " "    Types: Cigarettes   • Smokeless tobacco: Never Used   Substance and Sexual Activity   • Alcohol use: No     Frequency: Never     Comment: \"very little\"   • Drug use: No   • Sexual activity: Defer           Objective    /84   Pulse 66   Temp 97.5 °F (36.4 °C) (Oral)   Resp 20   Wt 97.5 kg (215 lb)   SpO2 98%   BMI 29.99 kg/m²     Physical Exam   Constitutional: He is oriented to person, place, and time. He appears well-developed and well-nourished. He does not appear ill.   HENT:   Head: Normocephalic and atraumatic.   Mouth/Throat: Oropharynx is clear and moist.   Neck: Normal range of motion. Neck supple.   Cardiovascular: Normal rate, regular rhythm and normal heart sounds.   No murmur heard.  Pulmonary/Chest: Effort normal and breath sounds normal. No respiratory distress. He has no decreased breath sounds. He has no wheezes.   Intermittent dry cough   Abdominal: Soft. Bowel sounds are normal. There is no tenderness.   Musculoskeletal: Normal range of motion.   Lymphadenopathy:     He has no cervical adenopathy.   Neurological: He is alert and oriented to person, place, and time.   Skin: Skin is warm and dry. Capillary refill takes less than 2 seconds.   Nursing note and vitals reviewed.      Procedures  Results for orders placed or performed during the hospital encounter of 03/12/20   Influenza Antigen, Rapid - Swab, Nasopharynx   Result Value Ref Range    Influenza A Ag, EIA Negative Negative    Influenza B Ag, EIA Negative Negative   Comprehensive Metabolic Panel   Result Value Ref Range    Glucose 116 (H) 65 - 99 mg/dL    BUN 12 6 - 20 mg/dL    Creatinine 0.91 0.76 - 1.27 mg/dL    Sodium 141 136 - 145 mmol/L    Potassium 3.9 3.5 - 5.2 mmol/L    Chloride 108 (H) 98 - 107 mmol/L    CO2 20.0 (L) 22.0 - 29.0 mmol/L    Calcium 9.0 8.6 - 10.5 mg/dL    Total Protein 6.9 6.0 - 8.5 g/dL    Albumin 4.20 3.50 - 5.20 g/dL    ALT (SGPT) 19 1 - 41 U/L    AST (SGOT) 14 1 - 40 U/L    Alkaline Phosphatase 85 " 39 - 117 U/L    Total Bilirubin 0.4 0.2 - 1.2 mg/dL    eGFR Non African Amer 87 >60 mL/min/1.73    Globulin 2.7 gm/dL    A/G Ratio 1.6 g/dL    BUN/Creatinine Ratio 13.2 7.0 - 25.0    Anion Gap 13.0 5.0 - 15.0 mmol/L   Troponin   Result Value Ref Range    Troponin T <0.010 0.000 - 0.030 ng/mL   CBC Auto Differential   Result Value Ref Range    WBC 6.51 3.40 - 10.80 10*3/mm3    RBC 4.55 4.14 - 5.80 10*6/mm3    Hemoglobin 13.8 13.0 - 17.7 g/dL    Hematocrit 39.1 37.5 - 51.0 %    MCV 85.9 79.0 - 97.0 fL    MCH 30.3 26.6 - 33.0 pg    MCHC 35.3 31.5 - 35.7 g/dL    RDW 12.9 12.3 - 15.4 %    RDW-SD 40.0 37.0 - 54.0 fl    MPV 12.3 (H) 6.0 - 12.0 fL    Platelets 189 140 - 450 10*3/mm3    Neutrophil % 60.8 42.7 - 76.0 %    Lymphocyte % 26.6 19.6 - 45.3 %    Monocyte % 9.8 5.0 - 12.0 %    Eosinophil % 1.7 0.3 - 6.2 %    Basophil % 0.8 0.0 - 1.5 %    Immature Grans % 0.3 0.0 - 0.5 %    Neutrophils, Absolute 3.96 1.70 - 7.00 10*3/mm3    Lymphocytes, Absolute 1.73 0.70 - 3.10 10*3/mm3    Monocytes, Absolute 0.64 0.10 - 0.90 10*3/mm3    Eosinophils, Absolute 0.11 0.00 - 0.40 10*3/mm3    Basophils, Absolute 0.05 0.00 - 0.20 10*3/mm3    Immature Grans, Absolute 0.02 0.00 - 0.05 10*3/mm3    nRBC 0.0 0.0 - 0.2 /100 WBC     Xr Chest 2 View    Result Date: 3/12/2020  Narrative: [facility] [place] Patient Name: CRISTOPHERWILBURAL ATTENDING: KORIN LEE REFERRING: GARCIA ALEXANDRA ORDERING: GARCIA ALEXANDRA ----------------------- PROCEDURE: Chest, PA and lateral DATE OF EXAM: 3/12/2020 HISTORY: Cough for four days PA and lateral views of the chest were obtained. Study is compared to prior exam of 12/7/2018. FINDINGS: The lungs are satisfactorily expanded and clear of infiltrates or effusions. The heart size is normal. The vasculature does not appear congested and costophrenic angles are clear.     Impression: No active disease. Electronically signed by:  Tariq Augustin MD  3/12/2020 12:15 PM CDT Workstation: [id number]              ED Course  ED Course as of Mar 12 1317   Thu Mar 12, 2020   1305 Neutrophils Absolute: 3.96 [SH]      ED Course User Index  [SH] Allie Wolff APRN                                           [facility]  Number of Diagnoses or Management Options  Diagnosis management comments: Will treat for nausea and URI at this time. Advised to follow up with PCP on Monday if symptoms are not improving.        Amount and/or Complexity of Data Reviewed  Clinical lab tests: reviewed  Tests in the radiology section of CPT®: reviewed        Final diagnoses:   Upper respiratory tract infection, unspecified type            Allie Wolff, TALHA  03/12/20 1407

## 2020-03-12 NOTE — DISCHARGE INSTRUCTIONS
Follow up with your PCP in the next 3-4 days if symptoms are not improving or return to ER if you develop difficulty breathing.

## 2020-03-23 ENCOUNTER — HOSPITAL ENCOUNTER (OUTPATIENT)
Facility: HOSPITAL | Age: 55
Setting detail: OBSERVATION
Discharge: HOME | End: 2020-03-24
Attending: EMERGENCY MEDICINE | Admitting: INTERNAL MEDICINE

## 2020-03-23 ENCOUNTER — APPOINTMENT (OUTPATIENT)
Dept: MRI IMAGING | Facility: HOSPITAL | Age: 55
End: 2020-03-23

## 2020-03-23 ENCOUNTER — APPOINTMENT (OUTPATIENT)
Dept: CARDIOLOGY | Facility: HOSPITAL | Age: 55
End: 2020-03-23

## 2020-03-23 ENCOUNTER — APPOINTMENT (OUTPATIENT)
Dept: CT IMAGING | Facility: HOSPITAL | Age: 55
End: 2020-03-23

## 2020-03-23 ENCOUNTER — APPOINTMENT (OUTPATIENT)
Dept: GENERAL RADIOLOGY | Facility: HOSPITAL | Age: 55
End: 2020-03-23

## 2020-03-23 DIAGNOSIS — Z74.09 IMPAIRED FUNCTIONAL MOBILITY, BALANCE, GAIT, AND ENDURANCE: ICD-10-CM

## 2020-03-23 DIAGNOSIS — Z74.09 IMPAIRED MOBILITY AND ADLS: ICD-10-CM

## 2020-03-23 DIAGNOSIS — Z78.9 IMPAIRED MOBILITY AND ADLS: ICD-10-CM

## 2020-03-23 DIAGNOSIS — I63.9 CEREBROVASCULAR ACCIDENT (CVA), UNSPECIFIED MECHANISM (HCC): Primary | ICD-10-CM

## 2020-03-23 DIAGNOSIS — R53.1 ACUTE LEFT-SIDED WEAKNESS: ICD-10-CM

## 2020-03-23 DIAGNOSIS — R47.1 DYSARTHRIA AND ANARTHRIA: ICD-10-CM

## 2020-03-23 LAB
ABO GROUP BLD: NORMAL
ALBUMIN SERPL-MCNC: 4.3 G/DL (ref 3.5–5.2)
ALBUMIN/GLOB SERPL: 1.6 G/DL
ALP SERPL-CCNC: 84 U/L (ref 39–117)
ALT SERPL W P-5'-P-CCNC: 33 U/L (ref 1–41)
ANION GAP SERPL CALCULATED.3IONS-SCNC: 14 MMOL/L (ref 5–15)
APTT PPP: 26.6 SECONDS (ref 20–40.3)
AST SERPL-CCNC: 20 U/L (ref 1–40)
BASOPHILS # BLD AUTO: 0.08 10*3/MM3 (ref 0–0.2)
BASOPHILS NFR BLD AUTO: 1 % (ref 0–1.5)
BH CV ECHO MEAS - ACS: 2.2 CM
BH CV ECHO MEAS - AO MAX PG (FULL): 0.59 MMHG
BH CV ECHO MEAS - AO MAX PG: 6.3 MMHG
BH CV ECHO MEAS - AO MEAN PG (FULL): 0 MMHG
BH CV ECHO MEAS - AO MEAN PG: 3 MMHG
BH CV ECHO MEAS - AO ROOT AREA (BSA CORRECTED): 1.4
BH CV ECHO MEAS - AO ROOT AREA: 7.1 CM^2
BH CV ECHO MEAS - AO ROOT DIAM: 3 CM
BH CV ECHO MEAS - AO V2 MAX: 125 CM/SEC
BH CV ECHO MEAS - AO V2 MEAN: 88.2 CM/SEC
BH CV ECHO MEAS - AO V2 VTI: 31.2 CM
BH CV ECHO MEAS - ASC AORTA: 3 CM
BH CV ECHO MEAS - AVA(I,A): 3.4 CM^2
BH CV ECHO MEAS - AVA(I,D): 3.4 CM^2
BH CV ECHO MEAS - AVA(V,A): 3.6 CM^2
BH CV ECHO MEAS - AVA(V,D): 3.6 CM^2
BH CV ECHO MEAS - BSA(HAYCOCK): 2.2 M^2
BH CV ECHO MEAS - BSA: 2.1 M^2
BH CV ECHO MEAS - BZI_BMI: 30.7 KILOGRAMS/M^2
BH CV ECHO MEAS - BZI_METRIC_HEIGHT: 177.8 CM
BH CV ECHO MEAS - BZI_METRIC_WEIGHT: 97.1 KG
BH CV ECHO MEAS - EDV(CUBED): 103.2 ML
BH CV ECHO MEAS - EDV(MOD-SP2): 81.4 ML
BH CV ECHO MEAS - EDV(MOD-SP4): 97.8 ML
BH CV ECHO MEAS - EDV(TEICH): 101.9 ML
BH CV ECHO MEAS - EF(CUBED): 61.2 %
BH CV ECHO MEAS - EF(MOD-SP2): 53.2 %
BH CV ECHO MEAS - EF(MOD-SP4): 57.8 %
BH CV ECHO MEAS - EF(TEICH): 52.8 %
BH CV ECHO MEAS - ESV(CUBED): 40 ML
BH CV ECHO MEAS - ESV(MOD-SP2): 38.1 ML
BH CV ECHO MEAS - ESV(MOD-SP4): 41.3 ML
BH CV ECHO MEAS - ESV(TEICH): 48.1 ML
BH CV ECHO MEAS - FS: 27.1 %
BH CV ECHO MEAS - IVS/LVPW: 1.1
BH CV ECHO MEAS - IVSD: 1.1 CM
BH CV ECHO MEAS - LA DIMENSION: 3.7 CM
BH CV ECHO MEAS - LA/AO: 1.2
BH CV ECHO MEAS - LV DIASTOLIC VOL/BSA (35-75): 45.5 ML/M^2
BH CV ECHO MEAS - LV MASS(C)D: 167.3 GRAMS
BH CV ECHO MEAS - LV MASS(C)DI: 77.9 GRAMS/M^2
BH CV ECHO MEAS - LV MAX PG: 5.7 MMHG
BH CV ECHO MEAS - LV MEAN PG: 3 MMHG
BH CV ECHO MEAS - LV SYSTOLIC VOL/BSA (12-30): 19.2 ML/M^2
BH CV ECHO MEAS - LV V1 MAX: 119 CM/SEC
BH CV ECHO MEAS - LV V1 MEAN: 75.4 CM/SEC
BH CV ECHO MEAS - LV V1 VTI: 28.3 CM
BH CV ECHO MEAS - LVIDD: 4.7 CM
BH CV ECHO MEAS - LVIDS: 3.4 CM
BH CV ECHO MEAS - LVLD AP2: 8 CM
BH CV ECHO MEAS - LVLD AP4: 8.6 CM
BH CV ECHO MEAS - LVLS AP2: 6.8 CM
BH CV ECHO MEAS - LVLS AP4: 7.3 CM
BH CV ECHO MEAS - LVOT AREA (M): 3.8 CM^2
BH CV ECHO MEAS - LVOT AREA: 3.8 CM^2
BH CV ECHO MEAS - LVOT DIAM: 2.2 CM
BH CV ECHO MEAS - LVPWD: 0.96 CM
BH CV ECHO MEAS - MR MAX PG: 91 MMHG
BH CV ECHO MEAS - MR MAX VEL: 477 CM/SEC
BH CV ECHO MEAS - MV A MAX VEL: 61.7 CM/SEC
BH CV ECHO MEAS - MV DEC SLOPE: 473 CM/SEC^2
BH CV ECHO MEAS - MV E MAX VEL: 108 CM/SEC
BH CV ECHO MEAS - MV E/A: 1.8
BH CV ECHO MEAS - MV P1/2T MAX VEL: 111 CM/SEC
BH CV ECHO MEAS - MV P1/2T: 68.7 MSEC
BH CV ECHO MEAS - MVA P1/2T LCG: 2 CM^2
BH CV ECHO MEAS - MVA(P1/2T): 3.2 CM^2
BH CV ECHO MEAS - PA MAX PG (FULL): 1.2 MMHG
BH CV ECHO MEAS - PA MAX PG: 2.9 MMHG
BH CV ECHO MEAS - PA V2 MAX: 85.1 CM/SEC
BH CV ECHO MEAS - RAP SYSTOLE: 5 MMHG
BH CV ECHO MEAS - RV MAX PG: 1.7 MMHG
BH CV ECHO MEAS - RV MEAN PG: 1 MMHG
BH CV ECHO MEAS - RV V1 MAX: 65.3 CM/SEC
BH CV ECHO MEAS - RV V1 MEAN: 43.8 CM/SEC
BH CV ECHO MEAS - RV V1 VTI: 16 CM
BH CV ECHO MEAS - RVSP: 27.8 MMHG
BH CV ECHO MEAS - SI(AO): 102.7 ML/M^2
BH CV ECHO MEAS - SI(CUBED): 29.4 ML/M^2
BH CV ECHO MEAS - SI(LVOT): 50.1 ML/M^2
BH CV ECHO MEAS - SI(MOD-SP2): 20.2 ML/M^2
BH CV ECHO MEAS - SI(MOD-SP4): 26.3 ML/M^2
BH CV ECHO MEAS - SI(TEICH): 25 ML/M^2
BH CV ECHO MEAS - SV(AO): 220.5 ML
BH CV ECHO MEAS - SV(CUBED): 63.2 ML
BH CV ECHO MEAS - SV(LVOT): 107.6 ML
BH CV ECHO MEAS - SV(MOD-SP2): 43.3 ML
BH CV ECHO MEAS - SV(MOD-SP4): 56.5 ML
BH CV ECHO MEAS - SV(TEICH): 53.7 ML
BH CV ECHO MEAS - TR MAX VEL: 239 CM/SEC
BILIRUB SERPL-MCNC: 0.2 MG/DL (ref 0.2–1.2)
BLD GP AB SCN SERPL QL: NEGATIVE
BUN BLD-MCNC: 21 MG/DL (ref 6–20)
BUN/CREAT SERPL: 16.9 (ref 7–25)
CALCIUM SPEC-SCNC: 9.8 MG/DL (ref 8.6–10.5)
CHLORIDE SERPL-SCNC: 104 MMOL/L (ref 98–107)
CHOLEST SERPL-MCNC: 144 MG/DL (ref 0–200)
CO2 SERPL-SCNC: 21 MMOL/L (ref 22–29)
CREAT BLD-MCNC: 1.24 MG/DL (ref 0.76–1.27)
DEPRECATED RDW RBC AUTO: 40 FL (ref 37–54)
EOSINOPHIL # BLD AUTO: 0.16 10*3/MM3 (ref 0–0.4)
EOSINOPHIL NFR BLD AUTO: 1.9 % (ref 0.3–6.2)
ERYTHROCYTE [DISTWIDTH] IN BLOOD BY AUTOMATED COUNT: 12.9 % (ref 12.3–15.4)
GFR SERPL CREATININE-BSD FRML MDRD: 61 ML/MIN/1.73
GLOBULIN UR ELPH-MCNC: 2.7 GM/DL
GLUCOSE BLD-MCNC: 119 MG/DL (ref 65–99)
GLUCOSE BLDC GLUCOMTR-MCNC: 125 MG/DL (ref 70–130)
GLUCOSE BLDC GLUCOMTR-MCNC: 138 MG/DL (ref 70–130)
GLUCOSE BLDC GLUCOMTR-MCNC: 92 MG/DL (ref 70–130)
HBA1C MFR BLD: 5.7 % (ref 4.8–5.6)
HCT VFR BLD AUTO: 40 % (ref 37.5–51)
HDLC SERPL-MCNC: 44 MG/DL (ref 40–60)
HGB BLD-MCNC: 14.2 G/DL (ref 13–17.7)
HOLD SPECIMEN: NORMAL
IMM GRANULOCYTES # BLD AUTO: 0.02 10*3/MM3 (ref 0–0.05)
IMM GRANULOCYTES NFR BLD AUTO: 0.2 % (ref 0–0.5)
INR PPP: 1 (ref 0.8–1.2)
LDLC SERPL CALC-MCNC: 85 MG/DL (ref 0–100)
LDLC/HDLC SERPL: 1.93 {RATIO}
LYMPHOCYTES # BLD AUTO: 2.74 10*3/MM3 (ref 0.7–3.1)
LYMPHOCYTES NFR BLD AUTO: 33 % (ref 19.6–45.3)
Lab: NORMAL
MAXIMAL PREDICTED HEART RATE: 168 BPM
MCH RBC QN AUTO: 30.5 PG (ref 26.6–33)
MCHC RBC AUTO-ENTMCNC: 35.5 G/DL (ref 31.5–35.7)
MCV RBC AUTO: 86 FL (ref 79–97)
MONOCYTES # BLD AUTO: 0.84 10*3/MM3 (ref 0.1–0.9)
MONOCYTES NFR BLD AUTO: 10.1 % (ref 5–12)
NEUTROPHILS # BLD AUTO: 4.46 10*3/MM3 (ref 1.7–7)
NEUTROPHILS NFR BLD AUTO: 53.8 % (ref 42.7–76)
NRBC BLD AUTO-RTO: 0 /100 WBC (ref 0–0.2)
PLATELET # BLD AUTO: 173 10*3/MM3 (ref 140–450)
PMV BLD AUTO: 12.5 FL (ref 6–12)
POTASSIUM BLD-SCNC: 3.9 MMOL/L (ref 3.5–5.2)
PROT SERPL-MCNC: 7 G/DL (ref 6–8.5)
PROTHROMBIN TIME: 13 SECONDS (ref 11.1–15.3)
RBC # BLD AUTO: 4.65 10*6/MM3 (ref 4.14–5.8)
RH BLD: POSITIVE
SODIUM BLD-SCNC: 139 MMOL/L (ref 136–145)
STRESS TARGET HR: 143 BPM
T&S EXPIRATION DATE: NORMAL
TRIGL SERPL-MCNC: 76 MG/DL (ref 0–150)
TROPONIN T SERPL-MCNC: <0.01 NG/ML (ref 0–0.03)
VLDLC SERPL-MCNC: 15.2 MG/DL
WBC NRBC COR # BLD: 8.3 10*3/MM3 (ref 3.4–10.8)
WHOLE BLOOD HOLD SPECIMEN: NORMAL
WHOLE BLOOD HOLD SPECIMEN: NORMAL

## 2020-03-23 PROCEDURE — 93306 TTE W/DOPPLER COMPLETE: CPT | Performed by: INTERNAL MEDICINE

## 2020-03-23 PROCEDURE — 82962 GLUCOSE BLOOD TEST: CPT

## 2020-03-23 PROCEDURE — 93010 ELECTROCARDIOGRAM REPORT: CPT | Performed by: INTERNAL MEDICINE

## 2020-03-23 PROCEDURE — G0378 HOSPITAL OBSERVATION PER HR: HCPCS

## 2020-03-23 PROCEDURE — 92610 EVALUATE SWALLOWING FUNCTION: CPT | Performed by: SPEECH-LANGUAGE PATHOLOGIST

## 2020-03-23 PROCEDURE — 92523 SPEECH SOUND LANG COMPREHEN: CPT | Performed by: SPEECH-LANGUAGE PATHOLOGIST

## 2020-03-23 PROCEDURE — 86850 RBC ANTIBODY SCREEN: CPT | Performed by: EMERGENCY MEDICINE

## 2020-03-23 PROCEDURE — 36415 COLL VENOUS BLD VENIPUNCTURE: CPT | Performed by: NURSE PRACTITIONER

## 2020-03-23 PROCEDURE — 70498 CT ANGIOGRAPHY NECK: CPT

## 2020-03-23 PROCEDURE — 99024 POSTOP FOLLOW-UP VISIT: CPT | Performed by: NURSE PRACTITIONER

## 2020-03-23 PROCEDURE — 70496 CT ANGIOGRAPHY HEAD: CPT

## 2020-03-23 PROCEDURE — 96360 HYDRATION IV INFUSION INIT: CPT

## 2020-03-23 PROCEDURE — 70450 CT HEAD/BRAIN W/O DYE: CPT

## 2020-03-23 PROCEDURE — 70551 MRI BRAIN STEM W/O DYE: CPT

## 2020-03-23 PROCEDURE — 85730 THROMBOPLASTIN TIME PARTIAL: CPT | Performed by: EMERGENCY MEDICINE

## 2020-03-23 PROCEDURE — 83036 HEMOGLOBIN GLYCOSYLATED A1C: CPT | Performed by: NURSE PRACTITIONER

## 2020-03-23 PROCEDURE — 85025 COMPLETE CBC W/AUTO DIFF WBC: CPT | Performed by: EMERGENCY MEDICINE

## 2020-03-23 PROCEDURE — 80061 LIPID PANEL: CPT | Performed by: NURSE PRACTITIONER

## 2020-03-23 PROCEDURE — 96361 HYDRATE IV INFUSION ADD-ON: CPT

## 2020-03-23 PROCEDURE — 80053 COMPREHEN METABOLIC PANEL: CPT | Performed by: EMERGENCY MEDICINE

## 2020-03-23 PROCEDURE — 86901 BLOOD TYPING SEROLOGIC RH(D): CPT | Performed by: EMERGENCY MEDICINE

## 2020-03-23 PROCEDURE — 84484 ASSAY OF TROPONIN QUANT: CPT | Performed by: EMERGENCY MEDICINE

## 2020-03-23 PROCEDURE — 85610 PROTHROMBIN TIME: CPT | Performed by: EMERGENCY MEDICINE

## 2020-03-23 PROCEDURE — 99284 EMERGENCY DEPT VISIT MOD MDM: CPT

## 2020-03-23 PROCEDURE — 71045 X-RAY EXAM CHEST 1 VIEW: CPT

## 2020-03-23 PROCEDURE — 93005 ELECTROCARDIOGRAM TRACING: CPT | Performed by: EMERGENCY MEDICINE

## 2020-03-23 PROCEDURE — 0 IOPAMIDOL PER 1 ML: Performed by: EMERGENCY MEDICINE

## 2020-03-23 PROCEDURE — 93306 TTE W/DOPPLER COMPLETE: CPT

## 2020-03-23 PROCEDURE — 86900 BLOOD TYPING SEROLOGIC ABO: CPT | Performed by: EMERGENCY MEDICINE

## 2020-03-23 RX ORDER — SODIUM CHLORIDE 0.9 % (FLUSH) 0.9 %
10 SYRINGE (ML) INJECTION EVERY 12 HOURS SCHEDULED
Status: DISCONTINUED | OUTPATIENT
Start: 2020-03-23 | End: 2020-03-24 | Stop reason: HOSPADM

## 2020-03-23 RX ORDER — TOPIRAMATE 25 MG/1
25 TABLET ORAL 2 TIMES DAILY
COMMUNITY
Start: 2020-01-20

## 2020-03-23 RX ORDER — ASPIRIN 300 MG/1
300 SUPPOSITORY RECTAL DAILY
Status: DISCONTINUED | OUTPATIENT
Start: 2020-03-24 | End: 2020-03-24 | Stop reason: HOSPADM

## 2020-03-23 RX ORDER — SODIUM CHLORIDE 0.9 % (FLUSH) 0.9 %
10 SYRINGE (ML) INJECTION AS NEEDED
Status: DISCONTINUED | OUTPATIENT
Start: 2020-03-23 | End: 2020-03-24 | Stop reason: HOSPADM

## 2020-03-23 RX ORDER — SODIUM CHLORIDE 0.9 % (FLUSH) 0.9 %
10 SYRINGE (ML) INJECTION EVERY 12 HOURS SCHEDULED
Status: DISCONTINUED | OUTPATIENT
Start: 2020-03-23 | End: 2020-03-23

## 2020-03-23 RX ORDER — PANTOPRAZOLE SODIUM 40 MG/1
40 TABLET, DELAYED RELEASE ORAL DAILY
Status: DISCONTINUED | OUTPATIENT
Start: 2020-03-23 | End: 2020-03-24 | Stop reason: HOSPADM

## 2020-03-23 RX ORDER — SODIUM CHLORIDE 0.9 % (FLUSH) 0.9 %
10 SYRINGE (ML) INJECTION AS NEEDED
Status: DISCONTINUED | OUTPATIENT
Start: 2020-03-23 | End: 2020-03-23

## 2020-03-23 RX ORDER — CLOPIDOGREL BISULFATE 75 MG/1
75 TABLET ORAL DAILY
Status: DISCONTINUED | OUTPATIENT
Start: 2020-03-23 | End: 2020-03-24 | Stop reason: HOSPADM

## 2020-03-23 RX ORDER — ASPIRIN 81 MG/1
324 TABLET, CHEWABLE ORAL ONCE
Status: COMPLETED | OUTPATIENT
Start: 2020-03-23 | End: 2020-03-23

## 2020-03-23 RX ORDER — TOPIRAMATE 25 MG/1
25 TABLET ORAL 2 TIMES DAILY
Status: DISCONTINUED | OUTPATIENT
Start: 2020-03-23 | End: 2020-03-24 | Stop reason: HOSPADM

## 2020-03-23 RX ORDER — ATORVASTATIN CALCIUM 40 MG/1
40 TABLET, FILM COATED ORAL NIGHTLY
COMMUNITY
Start: 2019-12-20 | End: 2020-03-24 | Stop reason: HOSPADM

## 2020-03-23 RX ORDER — SUMATRIPTAN 50 MG/1
50 TABLET, FILM COATED ORAL
COMMUNITY

## 2020-03-23 RX ORDER — ASPIRIN 81 MG/1
81 TABLET, CHEWABLE ORAL DAILY
Status: DISCONTINUED | OUTPATIENT
Start: 2020-03-24 | End: 2020-03-24 | Stop reason: HOSPADM

## 2020-03-23 RX ORDER — FAMOTIDINE 40 MG/1
40 TABLET, FILM COATED ORAL NIGHTLY
COMMUNITY
Start: 2020-01-02 | End: 2021-01-02

## 2020-03-23 RX ORDER — ATORVASTATIN CALCIUM 40 MG/1
80 TABLET, FILM COATED ORAL NIGHTLY
Status: DISCONTINUED | OUTPATIENT
Start: 2020-03-23 | End: 2020-03-24 | Stop reason: HOSPADM

## 2020-03-23 RX ORDER — SODIUM CHLORIDE 9 MG/ML
150 INJECTION, SOLUTION INTRAVENOUS CONTINUOUS
Status: DISCONTINUED | OUTPATIENT
Start: 2020-03-23 | End: 2020-03-23

## 2020-03-23 RX ORDER — CLOPIDOGREL BISULFATE 75 MG/1
75 TABLET ORAL ONCE
Status: COMPLETED | OUTPATIENT
Start: 2020-03-23 | End: 2020-03-23

## 2020-03-23 RX ORDER — SODIUM CHLORIDE 9 MG/ML
150 INJECTION, SOLUTION INTRAVENOUS CONTINUOUS
Status: DISCONTINUED | OUTPATIENT
Start: 2020-03-23 | End: 2020-03-24

## 2020-03-23 RX ADMIN — IOPAMIDOL 90 ML: 755 INJECTION, SOLUTION INTRAVENOUS at 02:04

## 2020-03-23 RX ADMIN — PANTOPRAZOLE SODIUM 40 MG: 40 TABLET, DELAYED RELEASE ORAL at 08:57

## 2020-03-23 RX ADMIN — SODIUM CHLORIDE 1000 ML: 9 INJECTION, SOLUTION INTRAVENOUS at 02:46

## 2020-03-23 RX ADMIN — ATORVASTATIN CALCIUM 80 MG: 40 TABLET, FILM COATED ORAL at 04:03

## 2020-03-23 RX ADMIN — CLOPIDOGREL BISULFATE 75 MG: 75 TABLET ORAL at 08:57

## 2020-03-23 RX ADMIN — TOPIRAMATE 25 MG: 25 TABLET, FILM COATED ORAL at 08:57

## 2020-03-23 RX ADMIN — SODIUM CHLORIDE 1000 ML: 900 INJECTION, SOLUTION INTRAVENOUS at 04:39

## 2020-03-23 RX ADMIN — Medication 10 ML: at 08:57

## 2020-03-23 RX ADMIN — SODIUM CHLORIDE 150 ML/HR: 900 INJECTION, SOLUTION INTRAVENOUS at 14:10

## 2020-03-23 RX ADMIN — CLOPIDOGREL BISULFATE 75 MG: 75 TABLET ORAL at 02:46

## 2020-03-23 RX ADMIN — ATORVASTATIN CALCIUM 80 MG: 40 TABLET, FILM COATED ORAL at 20:08

## 2020-03-23 RX ADMIN — SODIUM CHLORIDE 150 ML/HR: 900 INJECTION, SOLUTION INTRAVENOUS at 20:08

## 2020-03-23 RX ADMIN — TOPIRAMATE 25 MG: 25 TABLET, FILM COATED ORAL at 20:08

## 2020-03-23 RX ADMIN — SODIUM CHLORIDE 150 ML/HR: 900 INJECTION, SOLUTION INTRAVENOUS at 06:53

## 2020-03-23 RX ADMIN — ASPIRIN 81 MG 324 MG: 81 TABLET ORAL at 02:46

## 2020-03-23 NOTE — ED NOTES
"NIH stroke scale performed at bedside with YUDI Mendiola on CCU/SDU. Stroke scale is currently zero. Pt reports almost \"back to normal\".      Fausto Valerio RN  03/23/20 4654    "

## 2020-03-23 NOTE — ED NOTES
Dr. Murillo at bedside talking with [facility] neuro services via telehealth.      Fausto Valerio, RN  03/23/20 3237

## 2020-03-23 NOTE — H&P
[facility] Medicine Admission      Date of Admission: 3/23/2020      Primary Care Physician: Becky Khan DO      Chief Complaint: Left-sided weakness and numbness    HPI:54-year-old gentleman with past medical history significant for hypertension, hypercholesterolemia, smoking and ongoing tobacco abuse, history of CVA in the past who presented to the hospital with complaints of left-sided numbness and weakness since midnight.  Patient reports that he went to bed and woke up at around midnight with the symptoms.  He denies any associated visual/auditory changes along with any bowel/bladder incontinence, any numbness/tingling/weakness in any other part of his body.    On arrival to the ER, he underwent CT scan head which did not show any acute abnormality.  CT angiogram of the neck and head were also unremarkable.  Telemetry neurology was contacted by the ER and patient is being admitted to the hospitalist service for further evaluation and management.        Concurrent Medical History:  has a past medical history of Chest pain, Dehydration, Dizziness, and Hypertension.    Past Surgical History:  has a past surgical history that includes Leg Surgery and Appendectomy.    Family History: family history includes Diabetes in his brother, mother, and sister; Heart disease in his mother; Leukemia in his sister; Lung cancer in his father.       Social History:  reports that he has been smoking cigarettes. He has been smoking about 0.50 packs per day. He has never used smokeless tobacco. He reports that he does not drink alcohol or use drugs.    Allergies: No Known Allergies    Medications:   Prior to Admission medications    Medication Sig Start Date End Date Taking? Authorizing Provider   amLODIPine (NORVASC) 10 MG tablet Take 10 mg by mouth Daily.   Yes Provider, MD Himanshu   aspirin 81 MG EC tablet Take 1 tablet by mouth Daily. 8/17/17  Yes Hemanth Murillo MD      atorvastatin (LIPITOR) 40 MG tablet Take 40 mg by mouth Every Night. 12/20/19  Yes ProviderHimanshu MD   carvedilol (COREG) 12.5 MG tablet Take 12.5 mg by mouth 2 (Two) Times a Day With Meals.   Yes ProviderHimanshu MD   famotidine (PEPCID) 40 MG tablet Take 40 mg by mouth Every Night. 1/2/20 1/2/21 Yes Himanshu Carl MD   hydrochlorothiazide (MICROZIDE) 12.5 MG capsule Take 12.5 mg by mouth Daily. 10/2/17  Yes ProviderHimanshu MD   lisinopril (PRINIVIL,ZESTRIL) 40 MG tablet Take 40 mg by mouth Daily.   Yes Emergency, Nurse Bertrand RN   pantoprazole (PROTONIX) 40 MG EC tablet Take 40 mg by mouth Daily. 5/21/18  Yes Emergency, Nurse Bertrand RN   SUMAtriptan (IMITREX) 50 MG tablet Take 50 mg by mouth Every 2 (Two) Hours As Needed for Migraine. Take one tablet at onset of headache. May repeat dose one time in 2 hours if headache not relieved.   Yes ProviderHimanshu MD   topiramate (TOPAMAX) 25 MG tablet Take 25 mg by mouth 2 (Two) Times a Day. 1/20/20  Yes ProviderHimanshu MD   azithromycin (ZITHROMAX) 250 MG tablet Take 1 tablet by mouth Daily. Take 2 tablets the first day, then 1 tablet daily for 4 days. 3/12/20   Allie Wolff APRN   benzonatate (TESSALON) 200 MG capsule Take 1 capsule by mouth 3 (Three) Times a Day As Needed for Cough. 3/12/20   Allie Wolff APRN   ibuprofen (ADVIL,MOTRIN) 600 MG tablet Take 1 tablet by mouth Every 6 (Six) Hours As Needed for Mild Pain . 12/8/19   Jossue Jones MD       Review of Systems:  Review of Systems   Otherwise complete ROS is negative except as mentioned above.  14 out of 14 systems were reviewed and were found negative except as described above  Physical Exam:   Temp:  [98.2 °F (36.8 °C)] 98.2 °F (36.8 °C)  Heart Rate:  [58-78] 58  Resp:  [20] 20  BP: (132-164)/(76-93) 137/80  Physical Exam  General: [Appears stated age, alert, oriented ×3, cooperative]  HEENT: [Normocephalic, atraumatic, EOMI, PERRLA,  unremarkable external ear, moist mucous membranes, supple neck, no lymphadenopathy]  CVS: [RRR, S1, S2, no murmurs, normal peripheral pulses]  Respiratory: [CTA bilaterally, symmetrical expansion, no wheezing, no rales, no crackles]  Gastrointestinal: [Soft, nontender, nondistended, no organomegaly could be appreciated, normal bowel sounds]  Musculoskeletal: [Grossly normal, no tenderness, normal ROM]  Skin: [No rashes, no erythema, no lesions]  Extremities: [Normal inspection.  No edema, no cyanosis, no clubbing.  Normal capillary refill]  Neuro: [Alert, oriented ×3, power 4/5 in left upper and lower extremity, otherwise grossly normal motor and sensory function]  Psychiatry: [No anxiety, no depression, nonsuicidal]        Results Reviewed:  I have personally reviewed current lab, radiology, and data and agree with results.  Lab Results (last 24 hours)     Procedure Component Value Units Date/Time    Extra Tubes [[id number]] Collected:  03/23/20 0149    Specimen:  Blood from Arm, Right Updated:  03/23/20 0300    Narrative:       The following orders were created for panel order Extra Tubes.  Procedure                               Abnormality         Status                     ---------                               -----------         ------                     [place] Blood Culture Lionel...[[id number]]                      Final result               Page Top[[id number]]                                         In process                   Please view results for these tests on the individual orders.    [place] Blood Culture Bottle Set [[id number]] Collected:  03/23/20 0149    Specimen:  Blood from Arm, Right Updated:  03/23/20 0300     Extra Tube Hold for add-ons.     Comment: Auto resulted.       [place] Draw [[id number]] Collected:  03/23/20 0141    Specimen:  Blood Updated:  03/23/20 0245    Narrative:       The following orders were created for panel order [place] Draw.  Procedure                                Abnormality         Status                     ---------                               -----------         ------                     Light Blue Top[[id number]]                                   Final result               Green Top (Gel)[[id number]]                                  Final result               Lavender Top[[id number]]                                     Final result               Gold Top - SST[[id number]]                                   Final result                 Please view results for these tests on the individual orders.    Light Blue Top [[id number]] Collected:  03/23/20 0141    Specimen:  Blood Updated:  03/23/20 0245     Extra Tube hold for add-on     Comment: Auto resulted       Green Top (Gel) [[id number]] Collected:  03/23/20 0141    Specimen:  Blood Updated:  03/23/20 0245     Extra Tube Hold for add-ons.     Comment: Auto resulted.       Lavender Top [[id number]] Collected:  03/23/20 0141    Specimen:  Blood Updated:  03/23/20 0245     Extra Tube hold for add-on     Comment: Auto resulted       Gold Top - SST [[id number]] Collected:  03/23/20 0141    Specimen:  Blood Updated:  03/23/20 0245     Extra Tube Hold for add-ons.     Comment: Auto resulted.       Comprehensive Metabolic Panel [[id number]]  (Abnormal) Collected:  03/23/20 0141    Specimen:  Blood Updated:  03/23/20 0217     Glucose 119 mg/dL      BUN 21 mg/dL      Creatinine 1.24 mg/dL      Sodium 139 mmol/L      Potassium 3.9 mmol/L      Chloride 104 mmol/L      CO2 21.0 mmol/L      Calcium 9.8 mg/dL      Total Protein 7.0 g/dL      Albumin 4.30 g/dL      ALT (SGPT) 33 U/L      AST (SGOT) 20 U/L      Alkaline Phosphatase 84 U/L      Total Bilirubin 0.2 mg/dL      eGFR Non African Amer 61 mL/min/1.73      Globulin 2.7 gm/dL      A/G Ratio 1.6 g/dL      BUN/Creatinine Ratio 16.9     Anion Gap 14.0 mmol/L     Narrative:       GFR Normal >60  Chronic Kidney Disease <60  Kidney Failure <15      Troponin [[id number]]  (Normal) Collected:   03/23/20 0141    Specimen:  Blood Updated:  03/23/20 0217     Troponin T <0.010 ng/mL     Narrative:       Troponin T Reference Range:  <= 0.03 ng/mL-   Negative for AMI  >0.03 ng/mL-     Abnormal for myocardial necrosis.  Clinicians would have to utilize clinical acumen, EKG, Troponin and serial changes to determine if it is an Acute Myocardial Infarction or myocardial injury due to an underlying chronic condition.       Results may be falsely decreased if patient taking Biotin.      Protime-INR [[id number]]  (Normal) Collected:  03/23/20 0141    Specimen:  Blood Updated:  03/23/20 0207     Protime 13.0 Seconds      INR 1.00    Narrative:       Therapeutic range for most indications is 2.0-3.0 INR,  or 2.5-3.5 for mechanical heart valves.    aPTT [[id number]]  (Normal) Collected:  03/23/20 0141    Specimen:  Blood Updated:  03/23/20 0207     PTT 26.6 seconds     Narrative:       The recommended Heparin therapeutic range is 68-97 seconds.    POC Glucose Once [[id number]]  (Normal) Collected:  03/23/20 0131    Specimen:  Blood Updated:  03/23/20 0155     Glucose 125 mg/dL      Comment: Result Not ConfirmedOperator: [id number] GINNY Quinn ID: [id number]       CBC & Differential [[id number]] Collected:  03/23/20 0141    Specimen:  Blood Updated:  03/23/20 0152    Narrative:       The following orders were created for panel order CBC & Differential.  Procedure                               Abnormality         Status                     ---------                               -----------         ------                     CBC Auto Differential[[id number]]        Abnormal            Final result                 Please view results for these tests on the individual orders.    CBC Auto Differential [[id number]]  (Abnormal) Collected:  03/23/20 0141    Specimen:  Blood Updated:  03/23/20 0152     WBC 8.30 10*3/mm3      RBC 4.65 10*6/mm3      Hemoglobin 14.2 g/dL      Hematocrit 40.0 %      MCV 86.0 fL      MCH 30.5 pg        MCHC 35.5 g/dL      RDW 12.9 %      RDW-SD 40.0 fl      MPV 12.5 fL      Platelets 173 10*3/mm3      Neutrophil % 53.8 %      Lymphocyte % 33.0 %      Monocyte % 10.1 %      Eosinophil % 1.9 %      Basophil % 1.0 %      Immature Grans % 0.2 %      Neutrophils, Absolute 4.46 10*3/mm3      Lymphocytes, Absolute 2.74 10*3/mm3      Monocytes, Absolute 0.84 10*3/mm3      Eosinophils, Absolute 0.16 10*3/mm3      Basophils, Absolute 0.08 10*3/mm3      Immature Grans, Absolute 0.02 10*3/mm3      nRBC 0.0 /100 WBC     Page Top [[id number]] Collected:  03/23/20 0149    Specimen:  Blood from Arm, Right Updated:  03/23/20 0149        Imaging Results (Last 24 Hours)     Procedure Component Value Units Date/Time    CT Angiogram Head [[id number]] Collected:  03/23/20 0201     Updated:  03/23/20 0241    Addenda:         ADDENDUM   ADDENDUM #1       Preliminary reports for CTs of the head and CTA head and neck  were called to Dr. Murillo on March 23, 2020 at approximately  2:38 AM CST.    Electronically signed by:  Tony Fernandez MD  3/23/2020 2:40 AM CDT  Workstation: [id number]    Signed:  03/23/20 0240 by Tony Fernandez MD    Narrative:       HISTORY:  cva    TECHNIQUE: Postcontrast angiographic imaging with 3-D  postprocessing imaging and multiplanar reformatted images of the  head and neck was performed utilizing 90 mL Isovue-370.    This exam was performed according to our departmental  dose-optimization program, which includes automated exposure  control, adjustment of the mA and/or kV according to patient size  and/or use of iterative reconstruction technique.    COMPARISON: None    FINDINGS:      CTA HEAD: No significant plaque formation is identified.  There  is no evidence of significant stenosis or aneurysm.  The [place] appears intact.  There is normal enhancement of the  dural venous sinuses.    CTA NECK: Normal-appearing aortic arch anatomy is noted.  The  origins of the great vessels are widely patent.   Carotid and  vertebral arteries are widely patent.  The patient is right  vertebral arterial dominant.  No discrete neck mass is  identified.  The visualized lung apices are clear.    There is incidental mild bilateral maxillary sinus mucosal  thickening without air-fluid level.      Impression:       No significant stenosis as above.    Electronically signed by:  Tony Fernandez MD  3/23/2020 2:36 AM CDT  Workstation: [id number]    CT Angiogram Neck [[id number]] Collected:  03/23/20 0201     Updated:  03/23/20 0238    Narrative:       HISTORY:  cva    TECHNIQUE: Postcontrast angiographic imaging with 3-D  postprocessing imaging and multiplanar reformatted images of the  head and neck was performed utilizing 90 mL Isovue-370.    This exam was performed according to our departmental  dose-optimization program, which includes automated exposure  control, adjustment of the mA and/or kV according to patient size  and/or use of iterative reconstruction technique.    COMPARISON: None    FINDINGS:      CTA HEAD: No significant plaque formation is identified.  There  is no evidence of significant stenosis or aneurysm.  The [place]  of [place] appears intact.  There is normal enhancement of the  dural venous sinuses.    CTA NECK: Normal-appearing aortic arch anatomy is noted.  The  origins of the great vessels are widely patent.  Carotid and  vertebral arteries are widely patent.  The patient is right  vertebral arterial dominant.  No discrete neck mass is  identified.  The visualized lung apices are clear.    There is incidental mild bilateral maxillary sinus mucosal  thickening without air-fluid level.      Impression:       No significant stenosis as above.    Electronically signed by:  Tony Fernandez MD  3/23/2020 2:36 AM CDT  Workstation: [id number]    CT Head Without Contrast Stroke Protocol [[id number]] Collected:  03/23/20 0152     Updated:  03/23/20 0228    Narrative:       HISTORY: cva, left sided weakness    COMPARISON:  January 13, 2018    TECHNIQUE: Multiple contiguous axial images of the head were  obtained.      This exam was performed according to our departmental  dose-optimization program, which includes automated exposure  control, adjustment of the mA and/or kV according to patient size  and/or use of iterative reconstruction technique.    CONTRAST: None    FINDINGS:    Brain parenchyma: There is no evidence of acute intracranial  hemorrhage, abnormal mass effect, or major vascular territorial  infarct.    Ventricles and extra-axial spaces: The ventricular system and  extra axial spaces appear within normal limits.    Calvarium and skull base: The calvarium appears intact.    Paranasal sinuses and mastoids: Clear as visualized      Impression:       No acute abnormality as above. If clinical concern  exists regarding an acute ischemic/vascular etiology being  responsible for patient's symptomatology, an MRI of the brain is  more sensitive than the current study, in ruling out such a  possibility.    Electronically signed by:  Tony Fernandez MD  3/23/2020 2:27 AM CDT  Workstation: [id number]    XR Chest 1 View [[id number]] Collected:  03/23/20 0216     Updated:  03/23/20 0221    Narrative:       History:  Acute Stroke Protocol (onset < 12 hrs)    Procedure: XR CHEST 1 VIEW    Comparison:  March 12, 2020    Findings:  Portable view of the chest was obtained at 2:03 AM.   The heart size is normal.  Lungs appear clear of active disease.   There is an incidental calcified granuloma again noted within the  right lung base. There is no definite pleural effusion or  pneumothorax.      Impression:       Impression:  No acute abnormality as above.     Electronically signed by:  Tony Fernandez MD  3/23/2020 2:20 AM CDT  Workstation: [id number]            Assessment:    Active Hospital Problems    Diagnosis   • Cerebrovascular accident (CVA) (CMS/[facility])             Plan:    54-year-old gentleman with past medical history significant for  hypertension, hypercholesterolemia, smoking ongoing tobacco abuse, history of?  Mini stroke who presented to the hospital with complaints of left-sided numbness and weakness possibly secondary to acute CVA.    Left-sided weakness/numbness:  Possibly secondary to acute CVA  Initiated on non-TPA stroke protocol  Admit to the stepdown bed  Close vitals monitoring  Fasting blood panel  Hemoglobin A1c  Echocardiogram  PT/OT/ consult  CTA of the head, CT angiogram of the head and neck negative as described above in HPI  MRI of the brain pending  Neurology on board  Aspirin, statin, Plavix initiated by neurology    Hypertension:  Hold all antihypertensive for permissive hypertension    Hypercholesterolemia:  Fasting lipid panel  Statin    GERD:  PPI    Smoking:  Smoking cessation counseling  Nicotine patch as needed        Patient is full code  Estimated length of stay less than 2 midnights  DVT prophylaxis SCDs        I discussed the patient's findings and my recommendations with: Patient    Radha Street MD

## 2020-03-23 NOTE — PROGRESS NOTES
Stroke Progress Note       Chief Complaint:  L sided numbness and weakness    Subjective     Subjective:  Cont to have mild L sided symptoms, but feels much better. No new symptoms.     Review of Systems   Constitutional: Negative.    HENT: Negative.    Eyes: Negative.    Respiratory: Negative.    Cardiovascular: Negative.    Gastrointestinal: Negative.    Endocrine: Negative.    Genitourinary: Negative.    Musculoskeletal: Negative.    Hematological: Negative.    Psychiatric/Behavioral: Negative.         Objective      Temp:  [97.7 °F (36.5 °C)-98.2 °F (36.8 °C)] 97.8 °F (36.6 °C)  Heart Rate:  [54-78] 68  Resp:  [18-20] 18  BP: (120-164)/(58-93) 137/83    Neurological Exam  Mental Status  Awake, alert and oriented to person, place and time. Mild dysarthria present. Language is fluent with no aphasia.    Cranial Nerves  CN II: Visual fields full to confrontation.  CN III, IV, VI: Extraocular movements intact bilaterally.  CN V:  Right: Facial sensation is normal.  Left: Diminished sensation of the entire left side of the face.  CN VII: Full and symmetric facial movement.  CN VIII: Hearing is normal.  CN IX, X: Palate elevates symmetrically  CN XI: Shoulder shrug strength is normal.  CN XII: Tongue midline without atrophy or fasciculations.    Motor    Mild LUE/LLE weakness per Stroke NP exam. No obvious drift. .    Sensory  Sensation: Decreased to LT on LUE, almost equal on LLE.     Reflexes  Not assessed.    Coordination  No dysmetria.    Gait  Not asessed.      Physical Exam   Constitutional: He appears well-developed and well-nourished.   Neck: Normal range of motion.   Cardiovascular: Normal rate.   Pulmonary/Chest: Effort normal.       Results Review:    I reviewed the patient's new clinical results.    Lab Results (last 24 hours)     Procedure Component Value Units Date/Time    Hemoglobin A1c [[id number]]  (Abnormal) Collected:  03/23/20 0532    Specimen:  Blood Updated:  03/23/20 0855     Hemoglobin A1C 5.70  %     Narrative:       Hemoglobin A1C Ranges:    Increased Risk for Diabetes  5.7% to 6.4%  Diabetes                     >= 6.5%  Diabetic Goal                < 7.0%    POC Glucose Once [[id number]]  (Normal) Collected:  03/23/20 0646    Specimen:  Blood Updated:  03/23/20 0749     Glucose 92 mg/dL      Comment: : [id number] MARIO PANMeter ID: [id number]       Lipid Panel [[id number]] Collected:  03/23/20 0532    Specimen:  Blood Updated:  03/23/20 0625     Total Cholesterol 144 mg/dL      Triglycerides 76 mg/dL      HDL Cholesterol 44 mg/dL      LDL Cholesterol  85 mg/dL      VLDL Cholesterol 15.2 mg/dL      LDL/HDL Ratio 1.93    Narrative:       Cholesterol Reference Ranges  (U.S. Department of Health and Human Services ATP III Classifications)    Desirable          <200 mg/dL  Borderline High    200-239 mg/dL  High Risk          >240 mg/dL      Triglyceride Reference Ranges  (U.S. Department of Health and Human Services ATP III Classifications)    Normal           <150 mg/dL  Borderline High  150-199 mg/dL  High             200-499 mg/dL  Very High        >500 mg/dL    HDL Reference Ranges  (U.S. Department of Health and Human Services ATP III Classifcations)    Low     <40 mg/dl (major risk factor for CHD)  High    >60 mg/dl ('negative' risk factor for CHD)        LDL Reference Ranges  (U.S. Department of Health and Human Services ATP III Classifcations)    Optimal          <100 mg/dL  Near Optimal     100-129 mg/dL  Borderline High  130-159 mg/dL  High             160-189 mg/dL  Very High        >189 mg/dL    Extra Tubes [[id number]] Collected:  03/23/20 0149    Specimen:  Blood from Arm, Right Updated:  03/23/20 0600    Narrative:       The following orders were created for panel order Extra Tubes.  Procedure                               Abnormality         Status                     ---------                               -----------         ------                     [place] Blood Culture  Lionel...[[id number]]                      Final result               Page Top[[id number]]                                         Final result                 Please view results for these tests on the individual orders.    Gray Top [[id number]] Collected:  03/23/20 0149    Specimen:  Blood from Arm, Right Updated:  03/23/20 0600     Extra Tube Hold for add-ons.     Comment: Auto resulted.       [place] Blood Culture Bottle Set [[id number]] Collected:  03/23/20 0149    Specimen:  Blood from Arm, Right Updated:  03/23/20 0300     Extra Tube Hold for add-ons.     Comment: Auto resulted.       [place] Draw [[id number]] Collected:  03/23/20 0141    Specimen:  Blood Updated:  03/23/20 0245    Narrative:       The following orders were created for panel order [place] Draw.  Procedure                               Abnormality         Status                     ---------                               -----------         ------                     Light Blue Top[[id number]]                                   Final result               Green Top (Gel)[[id number]]                                  Final result               Lavender Top[[id number]]                                     Final result               Gold Top - SST[[id number]]                                   Final result                 Please view results for these tests on the individual orders.    Light Blue Top [[id number]] Collected:  03/23/20 0141    Specimen:  Blood Updated:  03/23/20 0245     Extra Tube hold for add-on     Comment: Auto resulted       Green Top (Gel) [[id number]] Collected:  03/23/20 0141    Specimen:  Blood Updated:  03/23/20 0245     Extra Tube Hold for add-ons.     Comment: Auto resulted.       Lavender Top [[id number]] Collected:  03/23/20 0141    Specimen:  Blood Updated:  03/23/20 0245     Extra Tube hold for add-on     Comment: Auto resulted       Gold Top - SST [[id number]] Collected:  03/23/20 0141    Specimen:  Blood Updated:  03/23/20 0245      Extra Tube Hold for add-ons.     Comment: Auto resulted.       Comprehensive Metabolic Panel [[id number]]  (Abnormal) Collected:  03/23/20 0141    Specimen:  Blood Updated:  03/23/20 0217     Glucose 119 mg/dL      BUN 21 mg/dL      Creatinine 1.24 mg/dL      Sodium 139 mmol/L      Potassium 3.9 mmol/L      Chloride 104 mmol/L      CO2 21.0 mmol/L      Calcium 9.8 mg/dL      Total Protein 7.0 g/dL      Albumin 4.30 g/dL      ALT (SGPT) 33 U/L      AST (SGOT) 20 U/L      Alkaline Phosphatase 84 U/L      Total Bilirubin 0.2 mg/dL      eGFR Non African Amer 61 mL/min/1.73      Globulin 2.7 gm/dL      A/G Ratio 1.6 g/dL      BUN/Creatinine Ratio 16.9     Anion Gap 14.0 mmol/L     Narrative:       GFR Normal >60  Chronic Kidney Disease <60  Kidney Failure <15      Troponin [[id number]]  (Normal) Collected:  03/23/20 0141    Specimen:  Blood Updated:  03/23/20 0217     Troponin T <0.010 ng/mL     Narrative:       Troponin T Reference Range:  <= 0.03 ng/mL-   Negative for AMI  >0.03 ng/mL-     Abnormal for myocardial necrosis.  Clinicians would have to utilize clinical acumen, EKG, Troponin and serial changes to determine if it is an Acute Myocardial Infarction or myocardial injury due to an underlying chronic condition.       Results may be falsely decreased if patient taking Biotin.      Protime-INR [[id number]]  (Normal) Collected:  03/23/20 0141    Specimen:  Blood Updated:  03/23/20 0207     Protime 13.0 Seconds      INR 1.00    Narrative:       Therapeutic range for most indications is 2.0-3.0 INR,  or 2.5-3.5 for mechanical heart valves.    aPTT [[id number]]  (Normal) Collected:  03/23/20 0141    Specimen:  Blood Updated:  03/23/20 0207     PTT 26.6 seconds     Narrative:       The recommended Heparin therapeutic range is 68-97 seconds.    POC Glucose Once [[id number]]  (Normal) Collected:  03/23/20 0131    Specimen:  Blood Updated:  03/23/20 0155     Glucose 125 mg/dL      Comment: Result Not ConfirmedOperator:  [id number] [facility] ID: [id number]       CBC & Differential [[id number]] Collected:  03/23/20 0141    Specimen:  Blood Updated:  03/23/20 0152    Narrative:       The following orders were created for panel order CBC & Differential.  Procedure                               Abnormality         Status                     ---------                               -----------         ------                     CBC Auto Differential[[id number]]        Abnormal            Final result                 Please view results for these tests on the individual orders.    CBC Auto Differential [[id number]]  (Abnormal) Collected:  03/23/20 0141    Specimen:  Blood Updated:  03/23/20 0152     WBC 8.30 10*3/mm3      RBC 4.65 10*6/mm3      Hemoglobin 14.2 g/dL      Hematocrit 40.0 %      MCV 86.0 fL      MCH 30.5 pg      MCHC 35.5 g/dL      RDW 12.9 %      RDW-SD 40.0 fl      MPV 12.5 fL      Platelets 173 10*3/mm3      Neutrophil % 53.8 %      Lymphocyte % 33.0 %      Monocyte % 10.1 %      Eosinophil % 1.9 %      Basophil % 1.0 %      Immature Grans % 0.2 %      Neutrophils, Absolute 4.46 10*3/mm3      Lymphocytes, Absolute 2.74 10*3/mm3      Monocytes, Absolute 0.84 10*3/mm3      Eosinophils, Absolute 0.16 10*3/mm3      Basophils, Absolute 0.08 10*3/mm3      Immature Grans, Absolute 0.02 10*3/mm3      nRBC 0.0 /100 WBC         Ct Angiogram Head    Addendum Date: 3/23/2020     ADDENDUM ADDENDUM #1 Preliminary reports for CTs of the head and CTA head and neck were called to Dr. Murillo on March 23, 2020 at approximately 2:38 AM CST. Electronically signed by:  Tony Fernandez MD  3/23/2020 2:40 AM CDT Workstation: [id number]     Result Date: 3/23/2020  No significant stenosis as above. Electronically signed by:  Tony Fernandez MD  3/23/2020 2:36 AM CDT Workstation: [id number]    Ct Angiogram Neck    Result Date: 3/23/2020  No significant stenosis as above. Electronically signed by:  Tony Fernandez MD  3/23/2020 2:36 AM CDT  Workstation: [id number]    Mri Brain Without Contrast    Result Date: 3/23/2020  1. No radiographic evidence of acute intracranial pathology is no evidence of acute ischemia. 2. Findings compatible with chronic bilateral maxillary sinusitis. Electronically signed by:  Bailee Siddiqui MD  3/23/2020 10:24 AM CDT Workstation: [id number]    Xr Chest 1 View    Result Date: 3/23/2020  Impression:  No acute abnormality as above. Electronically signed by:  Tony Fernandez MD  3/23/2020 2:20 AM CDT Workstation: [id number]    Ct Head Without Contrast Stroke Protocol    Result Date: 3/23/2020  No acute abnormality as above. If clinical concern exists regarding an acute ischemic/vascular etiology being responsible for patient's symptomatology, an MRI of the brain is more sensitive than the current study, in ruling out such a possibility. Electronically signed by:  Tony Fernandez MD  3/23/2020 2:27 AM CDT Workstation: [id number]           Assessment/Plan     Assessment/Plan:  53 yo RHWM with h/o HTN, HLD, smoking, some family h/o stroke/MI in their 50s, comes in with L sided numbness and weakness- started about a week ago, but got better (not resolved), and then got worse this am.       1. Rt thalamic stroke- lacunar. Started on dual antiplatelets (POINT study), and full dose statins. Pt was to get MRI brain this am. Cont to keep him HOB flat for today. 2D echo today.   2. HTN- Hold any BP meds for now. Okay for permissive HTN for now. Dont treat unless SBP>200.   3. HLD- Started on full dose statins. Cont lipitor 80mg daily.   4. Smking- Need to quit. He has cut down from 2PPD to 1/2 PPD in last year, and Is hoping to quit.   5. Activity- Bedrest and HOB flat for today, except when eating.   6. Diet- Okay to get swallow eval.     Case was d/w pt, nursing and Hospitalist team. Thank you fort he consult.           Preet Johnston MD  03/23/20  10:35    This was an audio and video enabled telemedicine encounter.

## 2020-03-23 NOTE — H&P
Stroke Consult Note    Patient Name: Primo Mesa   MRN: [id number]  Age: 54 y.o.  Sex: male  : 1965    Primary Care Physician: Becky Khan DO  Referring Physician:  Hemanth Murillo MD    TIME STROKE TEAM CALLED: 305 EST     TIME PATIENT SEEN: 315 EST    Handedness: right  Race: WM     Chief Complaint/Reason for Consultation: left side numbness and weakness    HPI: Mr. Mesa is a 53yo RHWM with hx of HTN, HLD,migraine HA,and  tobacco abuse who comes in after waking up with left arm and leg numbness that has gotten slightly better. He stated that he went to bed around 9pm. He did not feel well at the time but did not have any focal sx. He woke up at midnight with his left arm and leg numb and he could not move them. He was gradually able to move them. He waited to see if the sx would get better and when they did not he came to ER, drove himself, to be evaluated.   1 week ago he had similar sx. He had numbness that lasted about 30 min, but he still felt weak, especially in the leg that caused him to stumble the rest of the day before it resolved.   He had a HA last night that was frontal in nature and he took 2 advil and it resolved. It was not like his typical migraines.  He denies having any vision changes or speech changes.  Last Known Normal Date/Time: 2100 3/22/2020 EST     Review of Systems   Constitutional: Negative.    HENT:        Headache   Eyes: Negative.    Respiratory: Negative.    Cardiovascular: Negative.    Gastrointestinal: Negative.    Endocrine: Negative.    Genitourinary: Negative.    Musculoskeletal:        See HPI   Skin: Negative.    Allergic/Immunologic: Negative.    Neurological:        See HPI   Hematological: Negative.    Psychiatric/Behavioral: Negative.         Temp:  [98.2 °F (36.8 °C)] 98.2 °F (36.8 °C)  Heart Rate:  [68] 68  Resp:  [20] 20  BP: (145)/(79) 145/79    Neurological Exam  Mental Status   Oriented to person, place, time and situation. Recent and remote  memory are intact. Speech is normal. Language is fluent with no aphasia. Fund of knowledge is appropriate for level of education.    Cranial Nerves  CN II: Visual fields full to confrontation.  CN III, IV, VI: Extraocular movements intact bilaterally. Pupils equal round and reactive to light bilaterally.  CN V:  Left: Diminished sensation of the entire left side of the face.  CN VII: Full and symmetric facial movement.  CN VIII: Hearing is normal.  CN IX, X: Palate elevates symmetrically  CN XI:  Left: Sternocleidomastoid strength is weak.  CN XII: Tongue midline without atrophy or fasciculations.    Motor  Normal muscle bulk throughout. Left pronator drift. Left hemiparesis. LUE +drift, decrease and slower hand  and finger tapping 4/5  LLE+drift 4/5.    Sensory  Light touch abnormality: Decreased to LT and PP left arm and leg. Pinprick abnormality:     Coordination  Right: Finger-to-nose normal. Rapid alternating movement normal. Heel-to-shin normal.  Left: Finger-to-nose normal. Rapid alternating movement abnormality: Heel-to-shin abnormality:  HTS with dysmetria to LLE within proportion to weakness .    Gait  Did not assess due to AIS.      Physical Exam   Constitutional: He appears well-developed.   Eyes: Pupils are equal, round, and reactive to light.   Pulmonary/Chest: Effort normal.   Psychiatric: His speech is normal.   Vitals reviewed.      Acute Stroke Data    Alteplase (tPA) Inclusion / Exclusion Criteria    Time: 02:50  Person Administering Scale: TALHA Hall    Inclusion Criteria  []   18 years of age or greater   []   Onset of symptoms < 4.5 hours before beginning treatment (stroke onset = time patient was last seen well or without symptoms).   []   Diagnosis of acute ischemic stroke causing measurable disabling deficit (Complete Hemianopia, Any Aphasia, Visual or Sensory Extinction, Any weakness limiting sustained effort against gravity)   []   Any remaining deficit considered  potentially disabling in view of patient and practitioner   Exclusion criteria (Do not proceed with Alteplase if any are checked under exclusion criteria)  []   Onset unknown or GREATER than 4.5 hours   []   ICH on CT/MRI   []   CT demonstrates hypodensity representing acute or subacute infarct   []   Significant head trauma or prior stroke in the previous 3 months   []   Symptoms suggestive of subarachnoid hemorrhage   []   History of un-ruptured intracranial aneurysm GREATER than 10 mm   []   Recent intracranial or intraspinal surgery within the last 3 months   []   Arterial puncture at a non-compressible site in the previous 7 days   []   Active internal bleeding   []   Acute bleeding tendency   []   Platelet count LESS than 100,000 for known hematological diseases such as leukemia, thrombocytopenia or chronic cirrhosis   []   Current use of anticoagulant with INR GREATER than 1.7 or PT GREATER than 15 seconds, aPTT GREATER than 40 seconds   []   Heparin received within 48 hours, resulting in abnormally elevated aPTT GREATER than upper limit of normal   []   Current use of direct thrombin inhibitors or direct factor Xa inhibitors in the past 48 hours   []   Elevated blood pressure refractory to treatment (systolic GREATER than 185 mm/Hg or diastolic  GREATER than 110 mm/Hg   []   Suspected infective endocarditis and aortic arch dissection   []   Current use of therapeutic treatment dose of low-molecular-weight heparin (LMWH) within the previous 24 hours   []   Structural GI malignancy or bleed   Relative exclusion for all patients  []   Only minor non-disabling symptoms   []   Pregnancy   []   Seizure at onset with postictal residual neurological impairments   []   Major surgery or previous trauma within past 14 days   []   History of previous spontaneous ICH, intracranial neoplasm, or AV malformation   []   Postpartum (within previous 14 days)   []   Recent GI or urinary tract hemorrhage (within previous 21  "days)   []   Recent acute MI (within previous 3 months)   []   History of un-ruptured intracranial aneurysm LESS than 10 mm   []   History of ruptured intracranial aneurysm   []   Blood glucose LESS than 50 mg/dL (2.7 mmol/L)   []   Dural puncture within the last 7 days   []   Known GREATER than 10 cerebral microbleeds   Additional exclusions for patients with symptoms onset between 3 and 4.5 hours.  []   Age > 80.   []   On any anticoagulants regardless of INR  >>> Warfarin (Coumadin), Heparin, Enoxaparin (Lovenox), fondaparinux (Arixtra), bivalirudin (Angiomax), Argatroban, dabigatran (Pradaxa), rivaroxaban (Xarelto), or apixaban (Eliquis)   []   Severe stroke (NIHSS > 25).   []   History of BOTH diabetes and previous ischemic stroke.   []   The risks and benefits have been discussed with the patient or family related to the administration of IV Alteplase for stroke symptoms.   []   I have discussed and reviewed the patient's case and imaging with the attending prior to IV Alteplase.    Time Alteplase administered       Past Medical History:   Diagnosis Date   • Chest pain    • Dehydration    • Dizziness    • Hypertension      Past Surgical History:   Procedure Laterality Date   • APPENDECTOMY     • LEG SURGERY       Family History   Problem Relation Age of Onset   • Heart disease Mother    • Diabetes Mother    • Lung cancer Father    • Leukemia Sister    • Diabetes Sister    • Diabetes Brother      Social History     Socioeconomic History   • Marital status:      Spouse name: Not on file   • Number of children: Not on file   • Years of education: Not on file   • Highest education level: Not on file   Tobacco Use   • Smoking status: Current Every Day Smoker     Packs/day: 0.50     Types: Cigarettes   • Smokeless tobacco: Never Used   Substance and Sexual Activity   • Alcohol use: No     Frequency: Never     Comment: \"very little\"   • Drug use: No   • Sexual activity: Defer     No Known Allergies  Prior to " Admission medications    Medication Sig Start Date End Date Taking? Authorizing Provider   amLODIPine (NORVASC) 10 MG tablet Take 10 mg by mouth Daily.    Himanshu Carl MD   aspirin 81 MG EC tablet Take 1 tablet by mouth Daily. 8/17/17   Hemanth Murillo MD   azithromycin (ZITHROMAX) 250 MG tablet Take 1 tablet by mouth Daily. Take 2 tablets the first day, then 1 tablet daily for 4 days. 3/12/20   Allie Wolff APRN   benzonatate (TESSALON) 200 MG capsule Take 1 capsule by mouth 3 (Three) Times a Day As Needed for Cough. 3/12/20   Allie Wolff APRN   carvedilol (COREG) 12.5 MG tablet Take 12.5 mg by mouth 2 (Two) Times a Day With Meals.    ProviderHimanshu MD   hydrochlorothiazide (MICROZIDE) 12.5 MG capsule Take 12.5 mg by mouth Daily. 10/2/17   Himanshu Carl MD   ibuprofen (ADVIL,MOTRIN) 600 MG tablet Take 1 tablet by mouth Every 6 (Six) Hours As Needed for Mild Pain . 12/8/19   Jossue Jones MD   lisinopril (PRINIVIL,ZESTRIL) 40 MG tablet Take 40 mg by mouth Daily.    Emergency, Nurse Bertrand, RN   pantoprazole (PROTONIX) 40 MG EC tablet Take 40 mg by mouth. 5/21/18   Emergency, Nurse Epic, RN       [facility] Meds:  Scheduled-   [START ON 3/24/2020] aspirin 81 mg Oral Daily   Or      [START ON 3/24/2020] aspirin 300 mg Rectal Daily   atorvastatin 80 mg Oral Nightly   clopidogrel 75 mg Oral Daily   sodium chloride 1,000 mL Intravenous Once   sodium chloride 1,000 mL Intravenous Once   sodium chloride 10 mL Intravenous Q12H     Infusions-   sodium chloride 150 mL/hr   sodium chloride 150 mL/hr      PRNs- sodium chloride  •  sodium chloride    Functional Status Prior to Current Stroke/Rhys Score: 0        NIH Stroke Scale  Time: 0230  Person Administering Scale: TALHA Hall    1a  Level of consciousness: 0=alert; keenly responsive   1b. LOC questions:  0=Performs both tasks correctly   1c. LOC commands: 0=Performs both tasks correctly   2.  Best  Gaze: 0=normal   3.  Visual: 0=No visual loss   4. Facial Palsy: 0=Normal symmetric movement   5a.  Motor left arm: 1 holds 90 (or 45) degrees for full 10 seconds   5b.  Motor right arm: 0   6a. motor left le   6b  Motor right le=No drift, limb holds 90 (or 45) degrees for full 10 seconds   7. Limb Ataxia: 0   8.  Sensory: 1=Mild to moderate sensory loss; patient feels pinprick is less sharp or is dull on the affected side; there is a loss of superficial pain with pinprick but patient is aware He is being touched   9. Best Language:  0=No aphasia, normal   10. Dysarthria: 0=Normal   11. Extinction and Inattention: 0=No abnormality    Total:   3       Results Reviewed:  I have personally reviewed current lab, radiology, and data and agree with results.  Lab Results (last 24 hours)     Procedure Component Value Units Date/Time    [place] Draw [[id number]] Collected:  20    Specimen:  Blood Updated:  20    Narrative:       The following orders were created for panel order [place] Draw.  Procedure                               Abnormality         Status                     ---------                               -----------         ------                     Light Blue Top[[id number]]                                   Final result               Green Top (Gel)[[id number]]                                  Final result               Lavender Top[[id number]]                                     Final result               Gold Top - SST[[id number]]                                   Final result                 Please view results for these tests on the individual orders.    Light Blue Top [[id number]] Collected:  20    Specimen:  Blood Updated:  20     Extra Tube hold for add-on     Comment: Auto resulted       Green Top (Gel) [[id number]] Collected:  20    Specimen:  Blood Updated:  20     Extra Tube Hold for add-ons.     Comment: Auto resulted.        Lavender Top [[id number]] Collected:  03/23/20 0141    Specimen:  Blood Updated:  03/23/20 0245     Extra Tube hold for add-on     Comment: Auto resulted       Gold Top - SST [[id number]] Collected:  03/23/20 0141    Specimen:  Blood Updated:  03/23/20 0245     Extra Tube Hold for add-ons.     Comment: Auto resulted.       Comprehensive Metabolic Panel [[id number]]  (Abnormal) Collected:  03/23/20 0141    Specimen:  Blood Updated:  03/23/20 0217     Glucose 119 mg/dL      BUN 21 mg/dL      Creatinine 1.24 mg/dL      Sodium 139 mmol/L      Potassium 3.9 mmol/L      Chloride 104 mmol/L      CO2 21.0 mmol/L      Calcium 9.8 mg/dL      Total Protein 7.0 g/dL      Albumin 4.30 g/dL      ALT (SGPT) 33 U/L      AST (SGOT) 20 U/L      Alkaline Phosphatase 84 U/L      Total Bilirubin 0.2 mg/dL      eGFR Non African Amer 61 mL/min/1.73      Globulin 2.7 gm/dL      A/G Ratio 1.6 g/dL      BUN/Creatinine Ratio 16.9     Anion Gap 14.0 mmol/L     Narrative:       GFR Normal >60  Chronic Kidney Disease <60  Kidney Failure <15      Troponin [[id number]]  (Normal) Collected:  03/23/20 0141    Specimen:  Blood Updated:  03/23/20 0217     Troponin T <0.010 ng/mL     Narrative:       Troponin T Reference Range:  <= 0.03 ng/mL-   Negative for AMI  >0.03 ng/mL-     Abnormal for myocardial necrosis.  Clinicians would have to utilize clinical acumen, EKG, Troponin and serial changes to determine if it is an Acute Myocardial Infarction or myocardial injury due to an underlying chronic condition.       Results may be falsely decreased if patient taking Biotin.      Protime-INR [[id number]]  (Normal) Collected:  03/23/20 0141    Specimen:  Blood Updated:  03/23/20 0207     Protime 13.0 Seconds      INR 1.00    Narrative:       Therapeutic range for most indications is 2.0-3.0 INR,  or 2.5-3.5 for mechanical heart valves.    aPTT [[id number]]  (Normal) Collected:  03/23/20 0141    Specimen:  Blood Updated:  03/23/20 0207     PTT 26.6 seconds      Narrative:       The recommended Heparin therapeutic range is 68-97 seconds.    POC Glucose Once [[id number]]  (Normal) Collected:  03/23/20 0131    Specimen:  Blood Updated:  03/23/20 0155     Glucose 125 mg/dL      Comment: Result Not ConfirmedOperator: [id number] GINNY Quinn ID: [id number]       CBC & Differential [[id number]] Collected:  03/23/20 0141    Specimen:  Blood Updated:  03/23/20 0152    Narrative:       The following orders were created for panel order CBC & Differential.  Procedure                               Abnormality         Status                     ---------                               -----------         ------                     CBC Auto Differential[[id number]]        Abnormal            Final result                 Please view results for these tests on the individual orders.    CBC Auto Differential [[id number]]  (Abnormal) Collected:  03/23/20 0141    Specimen:  Blood Updated:  03/23/20 0152     WBC 8.30 10*3/mm3      RBC 4.65 10*6/mm3      Hemoglobin 14.2 g/dL      Hematocrit 40.0 %      MCV 86.0 fL      MCH 30.5 pg      MCHC 35.5 g/dL      RDW 12.9 %      RDW-SD 40.0 fl      MPV 12.5 fL      Platelets 173 10*3/mm3      Neutrophil % 53.8 %      Lymphocyte % 33.0 %      Monocyte % 10.1 %      Eosinophil % 1.9 %      Basophil % 1.0 %      Immature Grans % 0.2 %      Neutrophils, Absolute 4.46 10*3/mm3      Lymphocytes, Absolute 2.74 10*3/mm3      Monocytes, Absolute 0.84 10*3/mm3      Eosinophils, Absolute 0.16 10*3/mm3      Basophils, Absolute 0.08 10*3/mm3      Immature Grans, Absolute 0.02 10*3/mm3      nRBC 0.0 /100 WBC     Extra Tubes [[id number]] Collected:  03/23/20 0149    Specimen:  Blood from Arm, Right Updated:  03/23/20 0149    Narrative:       The following orders were created for panel order Extra Tubes.  Procedure                               Abnormality         Status                     ---------                               -----------         ------                      Smiley Blood Culture Lionel...[[id number]]                      In process                 Page Top[[id number]]                                         In process                   Please view results for these tests on the individual orders.    Smiley Blood Culture Bottle Set [[id number]] Collected:  03/23/20 0149    Specimen:  Blood from Arm, Right Updated:  03/23/20 0149    Page Top [[id number]] Collected:  03/23/20 0149    Specimen:  Blood from Arm, Right Updated:  03/23/20 0149        Imaging Results (Last 24 Hours)     Procedure Component Value Units Date/Time    CT Angiogram Head [[id number]] Collected:  03/23/20 0201     Updated:  03/23/20 0241    Addenda:         ADDENDUM   ADDENDUM #1       Preliminary reports for CTs of the head and CTA head and neck  were called to Dr. Murillo on March 23, 2020 at approximately  2:38 AM CST.    Electronically signed by:  Tony Fernandez MD  3/23/2020 2:40 AM CDT  Workstation: [id number]    Signed:  03/23/20 0240 by Tony Fernandez MD    Narrative:       HISTORY:  cva    TECHNIQUE: Postcontrast angiographic imaging with 3-D  postprocessing imaging and multiplanar reformatted images of the  head and neck was performed utilizing 90 mL Isovue-370.    This exam was performed according to our departmental  dose-optimization program, which includes automated exposure  control, adjustment of the mA and/or kV according to patient size  and/or use of iterative reconstruction technique.    COMPARISON: None    FINDINGS:      CTA HEAD: No significant plaque formation is identified.  There  is no evidence of significant stenosis or aneurysm.  The [place]  of [place] appears intact.  There is normal enhancement of the  dural venous sinuses.    CTA NECK: Normal-appearing aortic arch anatomy is noted.  The  origins of the great vessels are widely patent.  Carotid and  vertebral arteries are widely patent.  The patient is right  vertebral arterial dominant.  No discrete neck mass  is  identified.  The visualized lung apices are clear.    There is incidental mild bilateral maxillary sinus mucosal  thickening without air-fluid level.      Impression:       No significant stenosis as above.    Electronically signed by:  Tony Fernandez MD  3/23/2020 2:36 AM CDT  Workstation: [id number]    CT Angiogram Neck [[id number]] Collected:  03/23/20 0201     Updated:  03/23/20 0238    Narrative:       HISTORY:  cva    TECHNIQUE: Postcontrast angiographic imaging with 3-D  postprocessing imaging and multiplanar reformatted images of the  head and neck was performed utilizing 90 mL Isovue-370.    This exam was performed according to our departmental  dose-optimization program, which includes automated exposure  control, adjustment of the mA and/or kV according to patient size  and/or use of iterative reconstruction technique.    COMPARISON: None    FINDINGS:      CTA HEAD: No significant plaque formation is identified.  There  is no evidence of significant stenosis or aneurysm.  The [place]  of [place] appears intact.  There is normal enhancement of the  dural venous sinuses.    CTA NECK: Normal-appearing aortic arch anatomy is noted.  The  origins of the great vessels are widely patent.  Carotid and  vertebral arteries are widely patent.  The patient is right  vertebral arterial dominant.  No discrete neck mass is  identified.  The visualized lung apices are clear.    There is incidental mild bilateral maxillary sinus mucosal  thickening without air-fluid level.      Impression:       No significant stenosis as above.    Electronically signed by:  Tony Fernandez MD  3/23/2020 2:36 AM CDT  Workstation: [id number]    CT Head Without Contrast Stroke Protocol [[id number]] Collected:  03/23/20 0152     Updated:  03/23/20 0228    Narrative:       HISTORY: cva, left sided weakness    COMPARISON: January 13, 2018    TECHNIQUE: Multiple contiguous axial images of the head were  obtained.      This exam was performed  according to our departmental  dose-optimization program, which includes automated exposure  control, adjustment of the mA and/or kV according to patient size  and/or use of iterative reconstruction technique.    CONTRAST: None    FINDINGS:    Brain parenchyma: There is no evidence of acute intracranial  hemorrhage, abnormal mass effect, or major vascular territorial  infarct.    Ventricles and extra-axial spaces: The ventricular system and  extra axial spaces appear within normal limits.    Calvarium and skull base: The calvarium appears intact.    Paranasal sinuses and mastoids: Clear as visualized      Impression:       No acute abnormality as above. If clinical concern  exists regarding an acute ischemic/vascular etiology being  responsible for patient's symptomatology, an MRI of the brain is  more sensitive than the current study, in ruling out such a  possibility.    Electronically signed by:  Tony Fernandez MD  3/23/2020 2:27 AM CDT  Workstation: [id number]    XR Chest 1 View [[id number]] Collected:  03/23/20 0216     Updated:  03/23/20 0221    Narrative:       History:  Acute Stroke Protocol (onset < 12 hrs)    Procedure: XR CHEST 1 VIEW    Comparison:  March 12, 2020    Findings:  Portable view of the chest was obtained at 2:03 AM.   The heart size is normal.  Lungs appear clear of active disease.   There is an incidental calcified granuloma again noted within the  right lung base. There is no definite pleural effusion or  pneumothorax.      Impression:       Impression:  No acute abnormality as above.     Electronically signed by:  Tony Fernandez MD  3/23/2020 2:20 AM CDT  Workstation: [id number]             Assessment/Plan:      1. Mr. Mesa is a 53yo RHWM with hx of HTN, HLD, migraine headache and HLD who had stroke sx 1 week ago lasted about 1 day and resolved then returned when waking up at midnight tonight with left side weakness and numbness of arm and leg as well as sensory loss in face. CT with subtle  hypodensity of the right BG. CTA with no stenosis or occlusion. Concern for capsular warning syndrome  Admit to telemetry  MRI in am  Lay HOB flat  NS 1 liter bolus followed by 150cc/hr  Asa and plavix  Statin 80mg  2Decho  Routine labs.  I have discussed plan with pt and ED MD Laverne Pressley, APRN  March 23, 2020  2:50 AM    Verbal consent taken.  Patient agreeable to be seen via telemedicine.    This was an audio and video enabled telemedicine encounter.

## 2020-03-23 NOTE — PLAN OF CARE
Problem: Patient Care Overview  Goal: Plan of Care Review  Outcome: Ongoing (interventions implemented as appropriate)  Flowsheets (Taken 3/23/2020 1413)  Plan of Care Reviewed With: patient  Outcome Summary: Pt seen for speech-language and dysphagia evaluations this date and participated well w/therapist.  Pt reported no hx of dysphagia; however, did report some change in sensation on left side of face.  Pt safely tolerated regular and puree solids and thin liquids via cup and straw w/no overt s/s of aspiration.  SLP administered MoCA to assess language and cognitive functions.  Pt reported having an 8th grade education.  Pt had a raw score of 27 and additional point added for less than a high school education, giving him a total score of 28/30.  That places pt in WNL for cognitive-linguistic functions.  Pt denied any changes in cognition or language.  Pt reported speech had returned to baseline function.  Skilled ST services not recommended at this time.  Recommend continued diet of regular solids/thin liquids.  SLP discussed results and recommendations w/pt and he was in agreement.

## 2020-03-23 NOTE — ED NOTES
Pt being evaluated by [facility] at this time.      Fausto Valerio, RN  03/23/20 0217

## 2020-03-23 NOTE — ACP (ADVANCE CARE PLANNING)
Discussed advance care planning with patient.  Patient completed a living will directive.  Copy sent to HIM to scan into patient's record.

## 2020-03-23 NOTE — THERAPY DISCHARGE NOTE
CCU-Stepdown - Speech Language Pathology Initial Eval/Discharge  [facility]     Patient Name: Primo Mesa  : 1965  MRN: [id number]  Today's Date: 3/23/2020               Admit Date: 3/23/2020     Pt seen for speech-language and dysphagia evaluations this date and participated well w/therapist. Pt reported no hx of dysphagia; however, did report some change in sensation on left side of face. Pt safely tolerated regular and puree solids and thin liquids via cup and straw w/no overt s/s of aspiration. SLP administered MoCA to assess language and cognitive functions. Pt reported having an 8th grade education. Pt had a raw score of 27 and additional point added for less than a high school education, giving him a total score of 28/30. That places pt in WNL for cognitive-linguistic functions. Pt denied any changes in cognition or language. Pt reported speech had returned to baseline function. Skilled ST services not recommended at this time. Recommend continued diet of regular solids/thin liquids. SLP discussed results and recommendations w/pt and he was in agreement.       Visit Dx:    ICD-10-CM ICD-9-CM   1. Cerebrovascular accident (CVA), unspecified mechanism (CMS/HCC) I63.9 434.91   2. Acute left-sided weakness R53.1 728.87   3. Dysarthria and anarthria R47.1 784.51     Patient Active Problem List   Diagnosis   • Acute bronchitis   • Cerebrovascular accident (CVA) (CMS/HCC)     Past Medical History:   Diagnosis Date   • Chest pain    • Dehydration    • Dizziness    • Hypertension      Past Surgical History:   Procedure Laterality Date   • APPENDECTOMY     • LEG SURGERY              EDUCATION  The patient has been educated in the following areas:   Results and recommendations.      SLP Recommendation and Plan           Anticipated Dischage Disposition: home with assist          Plan of Care Reviewed With: patient  Outcome Summary: Pt seen for speech-language and dysphagia evaluations this date and  participated well w/therapist.  Pt reported no hx of dysphagia; however, did report some change in sensation on left side of face.  Pt safely tolerated regular and puree solids and thin liquids via cup and straw w/no overt s/s of aspiration.  SLP administered MoCA to assess language and cognitive functions.  Pt reported having an 8th grade education.  Pt had a raw score of 27 and additional point added for less than a high school education, giving him a total score of 28/30.  That places pt in WNL for cognitive-linguistic functions.  Pt denied any changes in cognition or language.  Pt reported speech had returned to baseline function.  Skilled ST services not recommended at this time.  Recommend continued diet of regular solids/thin liquids.  SLP discussed results and recommendations w/pt and he was in agreement.              Time Calculation:   Time Calculation- SLP     Row Name 03/23/20 1411             Time Calculation- SLP    SLP Start Time  1031  -CK      SLP Stop Time  1125  -CK      SLP Time Calculation (min)  54 min  -CK      Total Timed Code Minutes- SLP  54 minute(s)  -CK      SLP Received On  03/23/20  -        User Key  (r) = Recorded By, (t) = Taken By, (c) = Cosigned By    Initials Name Provider Type    Aminah Zheng, MS CCC-SLP Speech and Language Pathologist          Therapy Charges for Today     Code Description Service Date Service Provider Modifiers Qty    73378075213 HC ST EVAL SPEECH AND PROD W LANG  2 3/23/2020 Aminah Roy MS CCC-SLP GN 1    70074716984 HC ST EVAL ORAL PHARYNG SWALLOW 2 3/23/2020 Aminah Roy MS CCC-SLP GN 1                   SLP Discharge Summary  Anticipated Dischage Disposition: home with assist  Reason for Discharge: other (see comments)(Eval only)  Progress Toward Achieving Short/long Term Goals: other (see comments)(Eval only)    MS GEORGINA MasSLP  3/23/2020  and CCU-Stepdown - Speech Language Pathology   Swallow Eval/Discharge   [place]     Patient Name: Primo Mesa  : 1965  MRN: [id number]  Today's Date: 3/23/2020               Admit Date: 3/23/2020    Visit Dx:    ICD-10-CM ICD-9-CM   1. Cerebrovascular accident (CVA), unspecified mechanism (CMS/HCC) I63.9 434.91   2. Acute left-sided weakness R53.1 728.87   3. Dysarthria and anarthria R47.1 784.51     Patient Active Problem List   Diagnosis   • Acute bronchitis   • Cerebrovascular accident (CVA) (CMS/HCC)     Past Medical History:   Diagnosis Date   • Chest pain    • Dehydration    • Dizziness    • Hypertension      Past Surgical History:   Procedure Laterality Date   • APPENDECTOMY     • LEG SURGERY            SWALLOW EVALUATION (last 72 hours)      SLP Adult Swallow Evaluation     Row Name 20 1031                   Rehab Evaluation    Document Type  evaluation  -CK        Subjective Information  no complaints  -CK        Patient Observations  alert;cooperative;agree to therapy  -CK           General Information    Patient Profile Reviewed  yes  -CK        Pertinent History Of Current Problem  Pt admitted to ED 2* stroke-like symptoms.  -CK        Current Method of Nutrition  NPO  -CK        Precautions/Limitations, Vision  WFL with corrective lenses  -CK        Precautions/Limitations, Hearing  WFL;for purposes of eval  -CK        Prior Level of Function-Communication  WFL  -CK        Prior Level of Function-Swallowing  no diet consistency restrictions  -CK           Pain Assessment    Additional Documentation  Pain Scale: Numbers Pre/Post-Treatment (Group)  -CK           Pain Scale: Numbers Pre/Post-Treatment    Pain Scale: Numbers, Pretreatment  0/10 - no pain  -CK           Oral Motor and Function    Dentition Assessment  missing teeth;teeth are in poor condition  -CK        Secretion Management  WNL/WFL  -CK        Mucosal Quality  moist, healthy  -CK           General Eating/Swallowing Observations    Respiratory Support Currently in Use  room air  -CK           User Key  (r) = Recorded By, (t) = Taken By, (c) = Cosigned By    Initials Name Effective Dates    Aminah Zheng MS CCC-SLP 02/11/20 -           EDUCATION  The patient has been educated in the following areas:   Results and recommendations.    SLP Recommendation and Plan                    Anticipated Dischage Disposition: home with assist                Plan of Care Reviewed With: patient  Outcome Summary: Pt seen for speech-language and dysphagia evaluations this date and participated well w/therapist.  Pt reported no hx of dysphagia; however, did report some change in sensation on left side of face.  Pt safely tolerated regular and puree solids and thin liquids via cup and straw w/no overt s/s of aspiration.  SLP administered MoCA to assess language and cognitive functions.  Pt reported having an 8th grade education.  Pt had a raw score of 27 and additional point added for less than a high school education, giving him a total score of 28/30.  That places pt in WNL for cognitive-linguistic functions.  Pt denied any changes in cognition or language.  Pt reported speech had returned to baseline function.  Skilled ST services not recommended at this time.  Recommend continued diet of regular solids/thin liquids.  SLP discussed results and recommendations w/pt and he was in agreement.             Time Calculation:   Time Calculation- SLP     Row Name 03/23/20 1411             Time Calculation- SLP    SLP Start Time  1031  -CK      SLP Stop Time  1125  -      SLP Time Calculation (min)  54 min  -      Total Timed Code Minutes- SLP  54 minute(s)  -      SLP Received On  03/23/20  -        User Key  (r) = Recorded By, (t) = Taken By, (c) = Cosigned By    Initials Name Provider Type    Aminah Zheng MS CCC-SLP Speech and Language Pathologist          Therapy Charges for Today     Code Description Service Date Service Provider Modifiers Qty    59224384551 HC ST EVAL SPEECH AND PROD W LANG  2  3/23/2020 Aminah Roy, MS CCC-SLP GN 1    [id number]  ST EVAL ORAL PHARYNG SWALLOW 2 3/23/2020 Aminah Roy, MS CCC-SLP GN 1               SLP Discharge Summary  Anticipated Dischage Disposition: home with assist  Reason for Discharge: other (see comments)(Eval only)  Progress Toward Achieving Short/long Term Goals: other (see comments)(Eval only)    Aminah Roy MS CCC-SLP  3/23/2020

## 2020-03-23 NOTE — ED PROVIDER NOTES
Subjective   Patient presents emergency department complaint of strokelike symptoms.  Patient notes left-sided tingling and weakness some gait difficulties as well as difficulty with his speech.  Patient states he feels like his tongue was filled with lidocaine which is somewhat improved over the course of the last half hour.  Patient was ambulatory.  Patient has decreased strength and in his upper and lower extremities.  Patient states similar symptoms of arm numbness and tingling earlier in the week, patient thought that he had slept on it wrong.  Patient states that he was not feeling good this evening and was anxious and wore out felt weak and then went to bed at about midnight and awoke at about 1 with the symptoms.  Patient stated home about 30 minutes after this and presented to the emergency department          Review of Systems   Constitutional: Negative for appetite change, chills and fever.   HENT: Negative.  Negative for congestion.    Eyes: Negative.  Negative for photophobia and visual disturbance.   Respiratory: Negative.  Negative for cough, chest tightness and shortness of breath.    Cardiovascular: Negative.  Negative for chest pain and palpitations.   Gastrointestinal: Negative.  Negative for abdominal pain, constipation, diarrhea, nausea and vomiting.   Endocrine: Negative.    Genitourinary: Negative.  Negative for decreased urine volume, dysuria, flank pain and hematuria.   Musculoskeletal: Negative.  Negative for arthralgias, back pain, myalgias, neck pain and neck stiffness.   Skin: Negative.  Negative for pallor.   Neurological: Positive for speech difficulty, weakness and numbness. Negative for dizziness, syncope, light-headedness and headaches.   Psychiatric/Behavioral: Negative.  Negative for confusion and suicidal ideas. The patient is not nervous/anxious.    All other systems reviewed and are negative.      Past Medical History:   Diagnosis Date   • Chest pain    • Dehydration    •  "Dizziness    • Hypertension        No Known Allergies    Past Surgical History:   Procedure Laterality Date   • APPENDECTOMY     • LEG SURGERY         Family History   Problem Relation Age of Onset   • Heart disease Mother    • Diabetes Mother    • Lung cancer Father    • Leukemia Sister    • Diabetes Sister    • Diabetes Brother        Social History     Socioeconomic History   • Marital status:      Spouse name: Not on file   • Number of children: Not on file   • Years of education: Not on file   • Highest education level: Not on file   Tobacco Use   • Smoking status: Current Every Day Smoker     Packs/day: 0.50     Types: Cigarettes   • Smokeless tobacco: Never Used   Substance and Sexual Activity   • Alcohol use: No     Frequency: Never     Comment: \"very little\"   • Drug use: No   • Sexual activity: Defer           Objective   Physical Exam   Constitutional: He is oriented to person, place, and time. He appears well-developed and well-nourished. No distress.   HENT:   Head: Normocephalic and atraumatic.   Left-sided facial droop.   Eyes: Pupils are equal, round, and reactive to light. Conjunctivae and EOM are normal.   Neck: Normal range of motion. Neck supple. No JVD present.   Cardiovascular: Normal rate, regular rhythm, normal heart sounds and intact distal pulses. Exam reveals no gallop and no friction rub.   No murmur heard.  Pulmonary/Chest: Effort normal. No respiratory distress. He has no wheezes. He has no rales. He exhibits no tenderness.   Abdominal: Soft. Bowel sounds are normal. He exhibits no distension and no mass. There is no tenderness. There is no rebound and no guarding.   Musculoskeletal: Normal range of motion.   Neurological: He is alert and oriented to person, place, and time. He displays normal reflexes. A cranial nerve deficit and sensory deficit is present. He exhibits abnormal muscle tone.   Patient with some mild tingling lower extremity greater than upper extremity.  Patient " has 5 out of 5 strength bilateral upper and lower extremities.  Patient does appear asymmetric in the upper and lower extremities with decreased strength on extension of the leg, and some drift in the left arm with decreased  strength.  Patient also with a left-sided facial droop compared to the right.  No tongue deviation is noted.   Skin: Skin is warm and dry. Capillary refill takes less than 2 seconds.   Psychiatric: He has a normal mood and affect. His behavior is normal. Judgment and thought content normal.   Nursing note and vitals reviewed.      Procedures           ED Course                                   Labs Reviewed   COMPREHENSIVE METABOLIC PANEL - Abnormal; Notable for the following components:       Result Value    Glucose 119 (*)     BUN 21 (*)     CO2 21.0 (*)     All other components within normal limits    Narrative:     GFR Normal >60  Chronic Kidney Disease <60  Kidney Failure <15     CBC WITH AUTO DIFFERENTIAL - Abnormal; Notable for the following components:    MPV 12.5 (*)     All other components within normal limits   PROTIME-INR - Normal    Narrative:     Therapeutic range for most indications is 2.0-3.0 INR,  or 2.5-3.5 for mechanical heart valves.   APTT - Normal    Narrative:     The recommended Heparin therapeutic range is 68-97 seconds.   TROPONIN (IN-HOUSE) - Normal    Narrative:     Troponin T Reference Range:  <= 0.03 ng/mL-   Negative for AMI  >0.03 ng/mL-     Abnormal for myocardial necrosis.  Clinicians would have to utilize clinical acumen, EKG, Troponin and serial changes to determine if it is an Acute Myocardial Infarction or myocardial injury due to an underlying chronic condition.       Results may be falsely decreased if patient taking Biotin.     POCT GLUCOSE FINGERSTICK - Normal   RAINBOW DRAW    Narrative:     The following orders were created for panel order [place] Draw.  Procedure                               Abnormality         Status                      ---------                               -----------         ------                     Light Blue Top[[id number]]                                   In process                 Green Top (Gel)[[id number]]                                  In process                 Lavender Top[[id number]]                                     In process                 Gold Top - SST[[id number]]                                   In process                   Please view results for these tests on the individual orders.   POCT GLUCOSE FINGERSTICK   TYPE AND SCREEN   PREVIOUS HISTORY   CBC AND DIFFERENTIAL    Narrative:     The following orders were created for panel order CBC & Differential.  Procedure                               Abnormality         Status                     ---------                               -----------         ------                     CBC Auto Differential[[id number]]        Abnormal            Final result                 Please view results for these tests on the individual orders.   LIGHT BLUE TOP   GREEN TOP   LAVENDER TOP   GOLD TOP - SST   EXTRA TUBES    Narrative:     The following orders were created for panel order Extra Tubes.  Procedure                               Abnormality         Status                     ---------                               -----------         ------                     [place] Blood Culture Lionel...[[id number]]                      In process                 Page Top[[id number]]                                         In process                   Please view results for these tests on the individual orders.   RAINBOW BLOOD CULTURE BOTTLES - 1 SET   GRAY TOP       XR Chest 1 View   Final Result   Impression:  No acute abnormality as above.       Electronically signed by:  Tony Fernandez MD  3/23/2020 2:20 AM CDT   Workstation: [id number]      CT Head Without Contrast Stroke Protocol   Final Result   No acute abnormality as above. If clinical concern   exists regarding an acute  ischemic/vascular etiology being   responsible for patient's symptomatology, an MRI of the brain is   more sensitive than the current study, in ruling out such a   possibility.      Electronically signed by:  Tony Fernandez MD  3/23/2020 2:27 AM CDT   Workstation: [id number]      CT Angiogram Neck    (Results Pending)   CT Angiogram Head    (Results Pending)     Patient seen and discussed with Dr. Pressley from the neurology service from [facility].  Patient with preceding symptoms before actually going to sleep at 9 PM.  Patient is outside the TPA window.  Patient does appear to have some capsular findings on CT scan seen by the neurologist.  Suggest fluid bolus and IV fluid rate with aspirin and Plavix at this juncture.  Patient admitted to the ICU for further stroke care.        MDM    Final diagnoses:   Cerebrovascular accident (CVA), unspecified mechanism (CMS/HCC)   Acute left-sided weakness   Dysarthria and anarthria            Hemanth Murillo MD  03/23/20 0229

## 2020-03-23 NOTE — ED NOTES
Pt to CT scan at this time via stretcher accompanied by this nurse.      Fausto Valerio RN  03/23/20 0213

## 2020-03-23 NOTE — PLAN OF CARE
"  Problem: Patient Care Overview  Goal: Plan of Care Review  Outcome: Ongoing (interventions implemented as appropriate)  Flowsheets (Taken 3/23/2020 1615)  Progress: improving  Plan of Care Reviewed With: patient  Outcome Summary: sensory impairment improved \"almost normal\", echo completed, cleared by speech, remains on bedrest with hob flat     "

## 2020-03-23 NOTE — PLAN OF CARE
Problem: Patient Care Overview  Goal: Individualization and Mutuality  Outcome: Ongoing (interventions implemented as appropriate)     Problem: Patient Care Overview  Goal: Plan of Care Review  Flowsheets (Taken 3/23/2020 0640)  Progress: no change  Plan of Care Reviewed With: patient  Outcome Summary: pt. arrived from ED 0530 with NIH of 2 due to sensory deficits remaining on left face, arm, and leg and slight left leg weakness; pt. AAO without complaint; VS WNL; passing bedside swallow test and diet ordered; to Echo and MRI today

## 2020-03-23 NOTE — PROGRESS NOTES
Discharge Planning Assessment  [facility]     Patient Name: Primo Mesa  MRN: [id number]  Today's Date: 3/23/2020    Admit Date: 3/23/2020    Discharge Needs Assessment     Row Name 03/23/20 0938       Living Environment    Lives With  spouse;child(yony), adult    Current Living Arrangements  home/apartment/condo    Primary Care Provided by  self    Provides Primary Care For  no one    Family Caregiver if Needed  significant other    Quality of Family Relationships  helpful;involved    Able to Return to Prior Arrangements  -- Awaiting MD and therapy recomendations.     Living Arrangement Comments  Pt resides at home with significant other and son.        Resource/Environmental Concerns    Resource/Environmental Concerns  none Pt's significant other reports no needs at this time.     Transportation Concerns  car, none       Transition Planning    Patient/Family Anticipates Transition to  home with family    Transportation Anticipated  family or friend will provide       Discharge Needs Assessment    Concerns to be Addressed  adjustment to diagnosis/illness    Equipment Currently Used at Home  none    Equipment Needed After Discharge  -- Awaiting PT/OT recomendations.     Discharge Facility/Level of Care Needs  -- Awaiting PT/OT recomendations.     Current Discharge Risk  chronically ill        Discharge Plan     Row Name 03/23/20 0941       Plan    Plan Comments  LSW assessment complete. All information was gathered from pt's significant other via telephone. She reports pt resides at home with her and son. She reports good support system. Pt was independent prior to hospitalization. Pt is employed full time. Her goal is for pt to return home at d/c. She is unsure of needs at this time. LSW awaiting recomendations from PT/OT. LSW/case mgt will follow up as consulted and complete arrangements as ordered. Olvin Mayer LSW    Row Name 03/23/20 0721       Plan    Plan Comments  Possible CVA - Awaiting MRI.     M.  YUDI Denney [facility]         Destination      Coordination has not been started for this encounter.      Durable Medical Equipment      Coordination has not been started for this encounter.      Dialysis/Infusion      Coordination has not been started for this encounter.      Home Medical Care      Coordination has not been started for this encounter.      Therapy      Coordination has not been started for this encounter.      Community Resources      Coordination has not been started for this encounter.          Demographic Summary     Row Name 03/23/20 0936       General Information    Admission Type  observation    Referral Source  physician    Reason for Consult  discharge planning    Preferred Language  English     Used During This Interaction  yes       Contact Information    Contact Information Obtained for          Functional Status     Row Name 03/23/20 0937       Functional Status    Usual Activity Tolerance  good    Current Activity Tolerance  fair       Functional Status, IADL    Medications  independent Pt uses [facility]    Meal Preparation  independent    Housekeeping  independent    Laundry  independent    Shopping  independent       Mental Status Summary    Recent Changes in Mental Status/Cognitive Functioning  unable to assess       Employment/    Employment Status  employed full time        Psychosocial    No documentation.       Abuse/Neglect    No documentation.       Legal    No documentation.       Substance Abuse    No documentation.       Patient Forms    No documentation.           LEANNE Beauchamp

## 2020-03-24 VITALS
SYSTOLIC BLOOD PRESSURE: 130 MMHG | TEMPERATURE: 97.3 F | DIASTOLIC BLOOD PRESSURE: 76 MMHG | HEIGHT: 70 IN | OXYGEN SATURATION: 97 % | BODY MASS INDEX: 30.87 KG/M2 | WEIGHT: 215.61 LBS | HEART RATE: 51 BPM | RESPIRATION RATE: 16 BRPM

## 2020-03-24 PROCEDURE — G0378 HOSPITAL OBSERVATION PER HR: HCPCS

## 2020-03-24 PROCEDURE — 97162 PT EVAL MOD COMPLEX 30 MIN: CPT

## 2020-03-24 PROCEDURE — 99213 OFFICE O/P EST LOW 20 MIN: CPT | Performed by: PSYCHIATRY & NEUROLOGY

## 2020-03-24 PROCEDURE — 97166 OT EVAL MOD COMPLEX 45 MIN: CPT

## 2020-03-24 PROCEDURE — 96361 HYDRATE IV INFUSION ADD-ON: CPT

## 2020-03-24 RX ORDER — LISINOPRIL 40 MG/1
40 TABLET ORAL
Status: DISCONTINUED | OUTPATIENT
Start: 2020-03-24 | End: 2020-03-24 | Stop reason: HOSPADM

## 2020-03-24 RX ORDER — CARVEDILOL 12.5 MG/1
12.5 TABLET ORAL 2 TIMES DAILY WITH MEALS
Status: DISCONTINUED | OUTPATIENT
Start: 2020-03-24 | End: 2020-03-24 | Stop reason: HOSPADM

## 2020-03-24 RX ORDER — HYDROCHLOROTHIAZIDE 12.5 MG/1
12.5 TABLET ORAL DAILY
Status: DISCONTINUED | OUTPATIENT
Start: 2020-03-24 | End: 2020-03-24 | Stop reason: HOSPADM

## 2020-03-24 RX ORDER — CLOPIDOGREL BISULFATE 75 MG/1
75 TABLET ORAL DAILY
Qty: 30 TABLET | Refills: 0 | Status: SHIPPED | OUTPATIENT
Start: 2020-03-25

## 2020-03-24 RX ORDER — ATORVASTATIN CALCIUM 80 MG/1
80 TABLET, FILM COATED ORAL NIGHTLY
Qty: 90 TABLET | Refills: 0 | Status: SHIPPED | OUTPATIENT
Start: 2020-03-24

## 2020-03-24 RX ADMIN — Medication 10 ML: at 00:57

## 2020-03-24 RX ADMIN — HYDROCHLOROTHIAZIDE 12.5 MG: 12.5 TABLET ORAL at 09:25

## 2020-03-24 RX ADMIN — SODIUM CHLORIDE 150 ML/HR: 900 INJECTION, SOLUTION INTRAVENOUS at 03:42

## 2020-03-24 RX ADMIN — LISINOPRIL 40 MG: 40 TABLET ORAL at 09:25

## 2020-03-24 RX ADMIN — CARVEDILOL 12.5 MG: 12.5 TABLET, FILM COATED ORAL at 09:25

## 2020-03-24 RX ADMIN — ASPIRIN 81 MG 81 MG: 81 TABLET ORAL at 08:25

## 2020-03-24 RX ADMIN — PANTOPRAZOLE SODIUM 40 MG: 40 TABLET, DELAYED RELEASE ORAL at 08:25

## 2020-03-24 RX ADMIN — CLOPIDOGREL BISULFATE 75 MG: 75 TABLET ORAL at 08:25

## 2020-03-24 RX ADMIN — TOPIRAMATE 25 MG: 25 TABLET, FILM COATED ORAL at 08:25

## 2020-03-24 NOTE — THERAPY EVALUATION
Acute Care - Occupational Therapy Initial Evaluation  [facility]     Patient Name: Primo Mesa  : 1965  MRN: [id number]  Today's Date: 3/24/2020  Onset of Illness/Injury or Date of Surgery: 20  Date of Referral to OT: 20  Referring Physician: Jinny PALENCIA    Admit Date: 3/23/2020       ICD-10-CM ICD-9-CM   1. Cerebrovascular accident (CVA), unspecified mechanism (CMS/HCC) I63.9 434.91   2. Acute left-sided weakness R53.1 728.87   3. Dysarthria and anarthria R47.1 784.51   4. Impaired functional mobility, balance, gait, and endurance Z74.09 V49.89   5. Impaired mobility and ADLs Z74.09 799.89     Patient Active Problem List   Diagnosis   • Acute bronchitis   • Cerebrovascular accident (CVA) (CMS/HCC)     Past Medical History:   Diagnosis Date   • Chest pain    • Dehydration    • Dizziness    • Hypertension      Past Surgical History:   Procedure Laterality Date   • APPENDECTOMY     • LEG SURGERY            OT ASSESSMENT FLOWSHEET (last 12 hours)      Occupational Therapy Evaluation     Row Name 20 0926                   OT Evaluation Time/Intention    Subjective Information  no complaints  -KO        Document Type  evaluation  -KO        Mode of Treatment  physical therapy;occupational therapy;co-treatment  -KO        Patient Effort  good  -KO           General Information    Patient Profile Reviewed?  yes  -KO        Onset of Illness/Injury or Date of Surgery  20  -KO        Referring Physician  Jinny PALENCIA  -KO        Patient Observations  alert;cooperative;agree to therapy  -KO        Patient/Family Observations  no family present   -KO        Equipment Currently Used at Home  cane, straight;grab bar has shower chair, BSC  -KO        Existing Precautions/Restrictions  fall  -KO        Equipment Issued to Patient  gait belt  -KO        Risks Reviewed  patient:;LOB;nausea/vomiting;dizziness;increased drainage;change in vital signs;increased discomfort;lines disloged  -KO         Benefits Reviewed  patient:;improve function;increase independence;increase strength;increase balance;decrease pain;decrease risk of DVT;improve skin integrity;increase knowledge  -KO           Relationship/Environment    Lives With  significant other;child(yony), adult  -KO           Resource/Environmental Concerns    Current Living Arrangements  home/apartment/condo  -KO           Home Main Entrance    Number of Stairs, Main Entrance  two  -KO        Stair Railings, Main Entrance  railings on both sides of stairs  -KO           Cognitive Assessment/Intervention- PT/OT    Affect/Mental Status (Cognitive)  WFL  -KO        Orientation Status (Cognition)  oriented x 4  -KO        Follows Commands (Cognition)  WFL  -KO        Personal Safety Interventions  gait belt;fall prevention program maintained;nonskid shoes/slippers when out of bed;supervised activity  -KO           Bed Mobility Assessment/Treatment    Bed Mobility Assessment/Treatment  supine-sit;sit-supine  -KO        Supine-Sit [place] (Bed Mobility)  independent  -KO        Sit-Supine [place] (Bed Mobility)  independent  -KO           Functional Mobility    Functional Mobility- Ind. Level  independent  -KO           Transfer Assessment/Treatment    Transfer Assessment/Treatment  sit-stand transfer;stand-sit transfer;toilet transfer;bathtub transfer  -KO           Sit-Stand Transfer    Sit-Stand [place] (Transfers)  independent  -KO           Stand-Sit Transfer    Stand-Sit [place] (Transfers)  independent  -KO           Toilet Transfer    Type (Toilet Transfer)  sit-stand;stand-sit  -KO        [place] Level (Toilet Transfer)  independent  -KO           Bathtub Transfer    Type (Bathtub Transfer)  -- simulated tub/shower transfer; no LOB  -KO        [place] Level (Bathtub Transfer)  supervision  -KO           ADL Assessment/Intervention    BADL Assessment/Intervention  lower body dressing;toileting  -KO           Lower Body  Dressing Assessment/Training    Lower Body Dressing [place] Level  socks;shoes/slippers;independent  -KO        Lower Body Dressing Position  edge of bed sitting  -KO           Toileting Assessment/Training    [place] Level (Toileting)  toileting skills;independent  -KO        Toileting Position  unsupported sitting  -KO           General ROM    GENERAL ROM COMMENTS  BUE AROM WFLS  -KO           MMT (Manual Muscle Testing)    General MMT Comments  BUE grossly 5/5  -KO           Sensory Assessment/Intervention    Sensory General Assessment  no sensation deficits identified  -KO           Positioning and Restraints    Pre-Treatment Position  in bed  -KO        Post Treatment Position  bed  -KO        In Bed  fowlers;call light within reach;encouraged to call for assist;patient within staff view;side rails up x2  -KO           Pain Scale: Numbers Pre/Post-Treatment    Pain Scale: Numbers, Pretreatment  0/10 - no pain  -KO        Pain Scale: Numbers, Post-Treatment  0/10 - no pain  -KO           Plan of Care Review    Plan of Care Reviewed With  patient  -KO           Clinical Impression (OT)    Date of Referral to OT  03/23/20  -KO        OT Diagnosis  CVA  -KO        Patient/Family Goals Statement (OT Eval)  return to OF; home  -KO        Criteria for Skilled Therapeutic Interventions Met (OT Eval)  no;treatment indicated;current level of function same as previous level of function  -KO        Therapy Frequency (OT Eval)  evaluation only  -KO        Care Plan Review (OT)  evaluation/treatment results reviewed  -KO        Anticipated Discharge Disposition (OT)  home  -KO           Vital Signs    Pre Systolic BP Rehab  172  -KO        Pre Treatment Diastolic BP  92  -KO        Post Systolic BP Rehab  152  -KO        Post Treatment Diastolic BP  83  -KO        Pretreatment Heart Rate (beats/min)  65  -KO        Posttreatment Heart Rate (beats/min)  71  -KO        Pre SpO2 (%)  97  -KO        O2 Delivery Pre  Treatment  room air  -KO        Post SpO2 (%)  96  -KO        O2 Delivery Post Treatment  room air  -KO        Pre Patient Position  Supine  -KO        Post Patient Position  Sitting  -KO           Discharge Summary (Occupational Therapy)    Additional Documentation  Discharge Summary, OT Eval (Group)  -KO           Discharge Summary, OT Eval    Reason for Discharge (OT Discharge Summary)  no further needs identified  -KO           Living Environment    Home Accessibility  stairs to enter home;tub/shower is not walk in  -KO          User Key  (r) = Recorded By, (t) = Taken By, (c) = Cosigned By    Initials Name Effective Dates    KO Tom Patricia, OT 03/02/20 -                OT Recommendation and Plan  Outcome Summary/Treatment Plan (OT)  Anticipated Discharge Disposition (OT): home  Reason for Discharge (OT Discharge Summary): no further needs identified  Therapy Frequency (OT Eval): evaluation only  Plan of Care Review  Plan of Care Reviewed With: patient  Plan of Care Reviewed With: patient    Outcome Measures     Row Name 03/24/20 0926 03/24/20 0925          9 Hole Peg    9-Hole Peg Left  26  -KO  --     9-Hole Peg Right  25  -KO  --        How much help from another is currently needed...    Putting on and taking off regular lower body clothing?  4  -KO  --     Bathing (including washing, rinsing, and drying)  4  -KO  --     Toileting (which includes using toilet bed pan or urinal)  4  -KO  --     Putting on and taking off regular upper body clothing  4  -KO  --     Taking care of personal grooming (such as brushing teeth)  4  -KO  --     Eating meals  4  -KO  --     AM-PAC 6 Clicks Score (OT)  24  -KO  --        Modified Rhys Scale    Pre-Stroke Modified Aiken Scale  0 - No Symptoms at all.  -KO  0 - No Symptoms at all.  -KW     Modified Aiken Scale  0 - No Symptoms at all.  -KO  0 - No Symptoms at all.  -KW        Tinetti Assessment    Tinetti Assessment  --  yes  -KW     Sitting Balance  --  1  -KW      "Arises  --  2  -KW     Attempts to Rise  --  2  -KW     Immediate Standing Balance (first 5 sec)  --  2  -KW     Standing Balance  --  2  -KW     Sternal Nudge (feet close together)  --  2  -KW     Eyes Closed (feet close together)  --  1  -KW     Turning 360 Degrees- Steps  --  1  -KW     Turning 360 Degrees- Steadiness  --  1  -KW     Sitting Down  --  2  -KW     Tinetti Balance Score  --  16  -KW     Gait Initiation (immediate after told \"go\")  --  1  -KW     Step Length- Right Swing  --  1  -KW     Step Length- Left Swing  --  1  -KW     Foot Clearance- Right Foot  --  1  -KW     Foot Clearance- Left Foot  --  1  -KW     Step Symmetry  --  1  -KW     Step Continuity  --  1  -KW     Path (excursion)  --  2  -KW     Trunk  --  2  -KW     Base of Support  --  1  -KW     Gait Score  --  12  -KW     Tinetti Total Score  --  28  -KW        Functional Assessment    Outcome Measure Options  AM-PAC 6 Clicks Daily Activity (OT);9 Hole Peg;Modified [place]  -KO  Tinetti;Modified Rhys  -KW       User Key  (r) = Recorded By, (t) = Taken By, (c) = Cosigned By    Initials Name Provider Type    Hannah Mohr, ALEXANDER Physical Therapist    Patricia Gallego OT Occupational Therapist          Time Calculation:   Time Calculation- OT     Row Name 03/24/20 1053             Time Calculation- OT    OT Start Time  0925  -KO      OT Stop Time  1010  -KO      OT Time Calculation (min)  45 min  -KO      OT Received On  03/24/20  -JOSEFINA        User Key  (r) = Recorded By, (t) = Taken By, (c) = Cosigned By    Initials Name Provider Type    Patricia Gallego OT Occupational Therapist        Therapy Charges for Today     Code Description Service Date Service Provider Modifiers Qty    [id number]  OT EVAL MOD COMPLEXITY 3 3/24/2020 Patricia Santos OT GO 1               Patricia Santos OT  3/24/2020  "

## 2020-03-24 NOTE — PROGRESS NOTES
Stroke Progress Note       Chief Complaint:  L sided numbness and weakness    Subjective     Subjective:  Cont to have mild L sided symptoms, but feels almost back to normal. No new symptoms.     Review of Systems   Constitutional: Negative.    HENT: Negative.    Eyes: Negative.    Respiratory: Negative.    Cardiovascular: Negative.    Gastrointestinal: Negative.    Endocrine: Negative.    Genitourinary: Negative.    Musculoskeletal: Negative.    Hematological: Negative.    Psychiatric/Behavioral: Negative.         Objective      Temp:  [97.3 °F (36.3 °C)-98.2 °F (36.8 °C)] 97.3 °F (36.3 °C)  Heart Rate:  [52-64] 52  Resp:  [15-20] 16  BP: (133-165)/(68-96) 134/75    Neurological Exam  Mental Status  Awake, alert and oriented to person, place and time. Speech is normal. Language is fluent with no aphasia.    Cranial Nerves  CN II: Visual fields full to confrontation.  CN III, IV, VI: Extraocular movements intact bilaterally.  CN V:  Right: Facial sensation is normal.  Left: Diminished sensation of the entire left side of the face.  CN VII: Full and symmetric facial movement.  CN VIII: Hearing is normal.  CN IX, X: Palate elevates symmetrically  CN XI: Shoulder shrug strength is normal.  CN XII: Tongue midline without atrophy or fasciculations.    Motor    Strength seems much better per NP assessment today. 5/5 today bilateral. No obvious drift. .    Sensory  Sensation: Intact to LT today bilateral.     Reflexes  Not assessed.    Coordination  No dysmetria.    Gait  Not asessed.      Physical Exam   Constitutional: He appears well-developed and well-nourished.   Neck: Normal range of motion.   Cardiovascular: Normal rate.   Pulmonary/Chest: Effort normal.   Psychiatric: His speech is normal.       Results Review:    I reviewed the patient's new clinical results.    Lab Results (last 24 hours)     Procedure Component Value Units Date/Time    POC Glucose Once [[id number]]  (Abnormal) Collected:  03/23/20 6900     Specimen:  Blood Updated:  03/23/20 1121     Glucose 138 mg/dL      Comment: : [id number] ALY TORRESMeter ID: [id number]       Hemoglobin A1c [[id number]]  (Abnormal) Collected:  03/23/20 0532    Specimen:  Blood Updated:  03/23/20 0855     Hemoglobin A1C 5.70 %     Narrative:       Hemoglobin A1C Ranges:    Increased Risk for Diabetes  5.7% to 6.4%  Diabetes                     >= 6.5%  Diabetic Goal                < 7.0%        Ct Angiogram Head    Addendum Date: 3/23/2020     ADDENDUM ADDENDUM #1 Preliminary reports for CTs of the head and CTA head and neck were called to Dr. Murillo on March 23, 2020 at approximately 2:38 AM CST. Electronically signed by:  Tony Fernandez MD  3/23/2020 2:40 AM CDT Workstation: [id number]     Result Date: 3/23/2020  No significant stenosis as above. Electronically signed by:  Tony Fernandez MD  3/23/2020 2:36 AM CDT Workstation: [id number]    Ct Angiogram Neck    Result Date: 3/23/2020  No significant stenosis as above. Electronically signed by:  Tony Fernandez MD  3/23/2020 2:36 AM CDT Workstation: [id number]    Mri Brain Without Contrast    Result Date: 3/23/2020  1. No radiographic evidence of acute intracranial pathology is no evidence of acute ischemia. 2. Findings compatible with chronic bilateral maxillary sinusitis. Electronically signed by:  Bailee Siddiqui MD  3/23/2020 10:24 AM CDT Workstation: [id number]    Xr Chest 1 View    Result Date: 3/23/2020  Impression:  No acute abnormality as above. Electronically signed by:  Tony Fernandez MD  3/23/2020 2:20 AM CDT Workstation: [id number]    Ct Head Without Contrast Stroke Protocol    Result Date: 3/23/2020  No acute abnormality as above. If clinical concern exists regarding an acute ischemic/vascular etiology being responsible for patient's symptomatology, an MRI of the brain is more sensitive than the current study, in ruling out such a possibility. Electronically signed by:  Tony Fernandez MD  3/23/2020 2:27 AM CDT  Workstation: [id number]    Results for orders placed during the hospital encounter of 03/23/20   Adult Transthoracic Echo Complete W/ Cont if Necessary Per Protocol (With Agitated Saline)    Narrative · The study is technically difficult secondary to body habitus.  · Left ventricle systolic function is low normal at 51-55%. Mild   concentric hypertrophy with normal diastolic function.  · Right ventricle systolic function is normal. Right ventricular cavity is   mildly dilated.  · Saline tests are negative. No evidence of a PFO or intracardiac shunt.            Assessment/Plan     Assessment/Plan:  53 yo RHWM with h/o HTN, HLD, smoking, some family h/o stroke/MI in their 50s, comes in with L sided numbness and weakness- started about a week ago, but got better (not resolved), and then got worse on the day of admission       1. Rt thalamic stroke- lacunar- His MRI brain is negative for acute event, but he has clinical diagnosis of Rt thalamic stroke. Occasionally, thalamic stroke may not be seen well on MRI. His CTA also shows mild cerebral atherosclerosis, for which will cont dual antiplatelets ASA 81mg, plavix 75mg daily for 1 mth, then stop plavix, and cont ASA 81mg daily. Cont lipitor 80mg daily as well. 2D echo was negative.    2. HTN- Start normalizing BP now. Can resume his home medications.    3. HLD- Cont lipitor 80mg daily.   4. Smking- Need to quit. He has cut down from 2PPD to 1/2 PPD in last year, and Is hoping to quit.   5. Activity- Increase activity with therapy today. If clears with therapy, can dc home today.   6. Diet- Healthy heart diet.     Case was d/w pt, nursing and Hospitalist team. Thank you for the consult.           Preet Johnston MD  03/24/20  08:48    This was an audio and video enabled telemedicine encounter.

## 2020-03-24 NOTE — THERAPY DISCHARGE NOTE
Acute Care - Physical Therapy Initial Eval/Discharge  [facility]     Patient Name: Primo Mesa  : 1965  MRN: [id number]  Today's Date: 3/24/2020   Onset of Illness/Injury or Date of Surgery: 20     Referring Physician: Jinny PALENCIA      Admit Date: 3/23/2020    Visit Dx:    ICD-10-CM ICD-9-CM   1. Cerebrovascular accident (CVA), unspecified mechanism (CMS/[facility]) I63.9 434.91   2. Acute left-sided weakness R53.1 728.87   3. Dysarthria and anarthria R47.1 784.51   4. Impaired functional mobility, balance, gait, and endurance Z74.09 V49.89     Patient Active Problem List   Diagnosis   • Acute bronchitis   • Cerebrovascular accident (CVA) (CMS/[facility])     Past Medical History:   Diagnosis Date   • Chest pain    • Dehydration    • Dizziness    • Hypertension      Past Surgical History:   Procedure Laterality Date   • APPENDECTOMY     • LEG SURGERY            PT ASSESSMENT (last 12 hours)      Physical Therapy Evaluation     Row Name 2025          PT Evaluation Time/Intention    Subjective Information  no complaints  -KW     Document Type  evaluation  -KW     Mode of Treatment  physical therapy;occupational therapy;co-treatment  -KW     Patient Effort  good  -KW     Row Name 20          General Information    Patient Profile Reviewed?  yes  -KW     Onset of Illness/Injury or Date of Surgery  20  -KW     Referring Physician  PRISCILLA PALENCIA  -KW     Patient Observations  alert;cooperative;agree to therapy  -KW     Patient/Family Observations  no family present  -KW     General Observations of Patient  fowlers, telemetry, IV  -KW     Prior Level of Function  independent:;gait;ADL's;work;driving;cooking;cleaning  -KW     Equipment Currently Used at Home  cane, straight;grab bar has shower chair, BSC  -KW     Existing Precautions/Restrictions  fall  -KW     Equipment Issued to Patient  gait belt  -KW     Risks Reviewed  patient:;LOB;nausea/vomiting;dizziness;increased discomfort;change  in vital signs;increased drainage;lines disloged  -     Benefits Reviewed  patient:;improve function;increase independence;increase strength;increase balance;decrease pain;decrease risk of DVT;improve skin integrity;increase knowledge  -     Row Name 03/24/20 0925          Relationship/Environment    Lives With  significant other;child(yony), adult  -     Row Name 03/24/20 0925          Resource/Environmental Concerns    Current Living Arrangements  home/apartment/condo  -     Row Name 03/24/20 0925          Living Environment    Home Accessibility  stairs to enter home;tub/shower is not walk in  -     Row Name 03/24/20 0925          Home Main Entrance    Number of Stairs, Main Entrance  two  -KW     Stair Railings, Main Entrance  none  -     Row Name 03/24/20 0925          Cognitive Assessment/Interventions    Additional Documentation  Cognitive Assessment/Intervention (Group)  -[facility] Name 03/24/20 0925          Cognitive Assessment/Intervention- PT/OT    Affect/Mental Status (Cognitive)  WFL  -KW     Orientation Status (Cognition)  oriented x 4  -KW     Follows Commands (Cognition)  WFL  -KW     Personal Safety Interventions  fall prevention program maintained;gait belt;nonskid shoes/slippers when out of bed;supervised activity  -     Row Name 03/24/20 0925          Bed Mobility Assessment/Treatment    Bed Mobility Assessment/Treatment  supine-sit;sit-supine  -KW     Supine-Sit [place] (Bed Mobility)  independent  -KW     Sit-Supine [place] (Bed Mobility)  independent  -     Row Name 03/24/20 0925          Transfer Assessment/Treatment    Transfer Assessment/Treatment  sit-stand transfer;stand-sit transfer  -KW     Sit-Stand [place] (Transfers)  independent  -     Stand-Sit [place] (Transfers)  independent  -     Row Name 03/24/20 0925          Sit-Stand Transfer    Assistive Device (Sit-Stand Transfers)  -- no AD  -     Row Name 03/24/20 0925          Stand-Sit  Transfer    Assistive Device (Stand-Sit Transfers)  -- no AD  -KW     Row Name 03/24/20 0925          Gait/Stairs Assessment/Training    Gait/Stairs Assessment/Training  gait/ambulation independence  -KW     [place] Level (Gait)  independent  -KW     Assistive Device (Gait)  -- no AD  -KW     Distance in Feet (Gait)  600ft  -KW     Pattern (Gait)  step-through  -KW     Comment (Gait/Stairs)  no deviations  -KW     Row Name 03/24/20 0925          General ROM    GENERAL ROM COMMENTS  BLE AROM WFL   -KW     Row Name 03/24/20 0925          MMT (Manual Muscle Testing)    General MMT Comments  BLE grossly 5/5  -KW     [facility] Name 03/24/20 0925          Sensory Assessment/Intervention    Sensory General Assessment  no sensation deficits identified BLE light touch assessed  -[facility] Name 03/24/20 0925          Pain Assessment    Additional Documentation  Pain Scale: Numbers Pre/Post-Treatment (Group)  -KW     Row Name 03/24/20 0925          Pain Scale: Numbers Pre/Post-Treatment    Pain Scale: Numbers, Pretreatment  0/10 - no pain  -     Pain Scale: Numbers, Post-Treatment  0/10 - no pain  -[facility] Name 03/24/20 0925          Plan of Care Review    Plan of Care Reviewed With  patient  -[facility] Name 03/24/20 0925          Physical Therapy Clinical Impression    Criteria for Skilled Interventions Met (PT Clinical Impression)  no;no problems identified which require skilled intervention;current level of function same as previous level of function  -     Care Plan Review (PT)  evaluation/treatment results reviewed;care plan/treatment goals reviewed;risks/benefits reviewed;current/potential barriers reviewed;patient/other agree to care plan  -[facility] Name 03/24/20 0925          Vital Signs    Pre Systolic BP Rehab  172  -KW     Pre Treatment Diastolic BP  92  -KW     Post Systolic BP Rehab  152  -KW     Post Treatment Diastolic BP  83  -KW     Pretreatment Heart Rate (beats/min)  65  -KW     Posttreatment Heart  Rate (beats/min)  71  -KW     Pre SpO2 (%)  97  -KW     O2 Delivery Pre Treatment  room air  -KW     Post SpO2 (%)  96  -KW     O2 Delivery Post Treatment  room air  -KW     Pre Patient Position  Supine  -KW     Post Patient Position  Sitting  -KW     Row Name 03/24/20 0925          Positioning and Restraints    Pre-Treatment Position  in bed  -KW     Post Treatment Position  bed  -KW     In Bed  fowlers;call light within reach;encouraged to call for assist;side rails up x2;patient within staff view  -KW       User Key  (r) = Recorded By, (t) = Taken By, (c) = Cosigned By    Initials Name Provider Type    Hannah Mohr, PT Physical Therapist              PT Recommendation and Plan  Anticipated Discharge Disposition (PT): home  Therapy Frequency (PT Clinical Impression): evaluation only  Outcome Summary/Treatment Plan (PT)  Anticipated Discharge Disposition (PT): home  Plan of Care Reviewed With: patient  Outcome Summary: PT evaluation completed as co-eval with OT this date. Pt pleasant and agreeable to eval. Pt performing bed mobility and transfers with independence. Pt ambulating 600ft with independence and no AD. Pt with no LOB or concern with ambulation and with normal gait pattern. Tinetti Balance Assessment performed with pt scoring 28/28, indicating low fall risk. Pt is at baseline function, independent with mobility, and has no further need for skilled PT in acute care. Upon d/c, recommend home. Will d/c from PT.    Outcome Measures     Row Name 03/24/20 0925             Modified Rhys Scale    Pre-Stroke Modified [place] Scale  0 - No Symptoms at all.  -KW      Modified [place] Scale  0 - No Symptoms at all.  -KW         Tinetti Assessment    Tinetti Assessment  yes  -KW      Sitting Balance  1  -KW      Arises  2  -KW      Attempts to Rise  2  -KW      Immediate Standing Balance (first 5 sec)  2  -KW      Standing Balance  2  -KW      Sternal Nudge (feet close together)  2  -KW      Eyes Closed (feet  "close together)  1  -KW      Turning 360 Degrees- Steps  1  -KW      Turning 360 Degrees- Steadiness  1  -KW      Sitting Down  2  -KW      Tinetti Balance Score  16  -KW      Gait Initiation (immediate after told \"go\")  1  -KW      Step Length- Right Swing  1  -KW      Step Length- Left Swing  1  -KW      Foot Clearance- Right Foot  1  -KW      Foot Clearance- Left Foot  1  -KW      Step Symmetry  1  -KW      Step Continuity  1  -KW      Path (excursion)  2  -KW      Trunk  2  -KW      Base of Support  1  -KW      Gait Score  12  -KW      Tinetti Total Score  28  -KW         Functional Assessment    Outcome Measure Options  Tinetti;Modified [place]  -KW        User Key  (r) = Recorded By, (t) = Taken By, (c) = Cosigned By    Initials Name Provider Type    Hannah Mohr, PT Physical Therapist           Time Calculation:   PT Charges     Row Name 03/24/20 1035             Time Calculation    Start Time  0925  -KW      Stop Time  1007  -KW      Time Calculation (min)  42 min  -KW      PT Received On  03/24/20  -KW        User Key  (r) = Recorded By, (t) = Taken By, (c) = Cosigned By    Initials Name Provider Type    Hannah Mohr, PT Physical Therapist        Therapy Charges for Today     Code Description Service Date Service Provider Modifiers Qty    36929865401 HC PT EVAL MOD COMPLEXITY 3 3/24/2020 Hannah Mata, PT GP 1          PT G-Codes  Outcome Measure Options: Tinetti, Modified Rhys  Modified Rhys Scale: 0 - No Symptoms at all.  Tinetti Total Score: 28    PT Discharge Summary  Anticipated Discharge Disposition (PT): home  Reason for Discharge: Independent, At baseline function  Outcomes Achieved: (eval only)    Hannah Mata, PT  3/24/2020       "

## 2020-03-24 NOTE — PLAN OF CARE
Problem: Patient Care Overview  Goal: Plan of Care Review  Outcome: Ongoing (interventions implemented as appropriate)  Flowsheets (Taken 3/24/2020 1031)  Plan of Care Reviewed With: patient  Outcome Summary: PT evaluation completed as co-eval with OT this date. Pt pleasant and agreeable to eval. Pt performing bed mobility and transfers with independence. Pt ambulating 600ft with independence and no AD. Pt with no LOB or concern with ambulation and with normal gait pattern. Tinetti Balance Assessment performed with pt scoring 28/28, indicating low fall risk. Pt is at baseline function, independent with mobility, and has no further need for skilled PT in acute care. Upon d/c, recommend home. Will d/c from PT.

## 2020-03-24 NOTE — DISCHARGE SUMMARY
[facility]  DISCHARGE SUMMARY       Date of Admission: 3/23/2020  Date of Discharge:  3/24/2020  Primary Care Physician: Becky Khan DO    Presenting Problem/History of Present Illness:  Acute left-sided weakness [R53.1]  Dysarthria and anarthria [R47.1]  Cerebrovascular accident (CVA), unspecified mechanism (CMS/HCC) [I63.9]       Final Discharge Diagnoses:  Active Hospital Problems    Diagnosis   • Cerebrovascular accident (CVA) (CMS/HCC)   Right thalamic stroke-lacunar  Essential hypertension  Smoker  Hyperlipidemia    Consults:   Consults     No orders found from 2/23/2020 to 3/24/2020.          Procedures Performed: None                Pertinent Test Results:   Collected Updated Procedure Result Status    03/23/2020 0532 03/23/2020 0855 Hemoglobin A1c [[id number]]    (Abnormal)   Blood    Final result Component Value Units   Hemoglobin A1C 5.70High  %           03/23/2020 0532 03/23/2020 0625 Lipid Panel [[id number]]    Blood    Final result Component Value Units   Total Cholesterol 144 mg/dL   Triglycerides 76 mg/dL   HDL Cholesterol 44 mg/dL   LDL Cholesterol  85 mg/dL   VLDL Cholesterol 15.2 mg/dL   LDL/HDL Ratio 1.93            03/23/2020 0141 03/23/2020 0245 Corder Draw [[id number]]    Blood    Final result Component Value   No component results           03/23/2020 0141 03/23/2020 0217 Comprehensive Metabolic Panel [[id number]]    (Abnormal)   Blood    Final result Component Value Units   Glucose 119High  mg/dL   BUN 21High  mg/dL   Creatinine 1.24 mg/dL   Sodium 139 mmol/L   Potassium 3.9 mmol/L   Chloride 104 mmol/L   CO2 21.0Low  mmol/L   Calcium 9.8 mg/dL   Total Protein 7.0 g/dL   Albumin 4.30 g/dL   ALT (SGPT) 33 U/L   AST (SGOT) 20 U/L   Alkaline Phosphatase 84 U/L   Total Bilirubin 0.2 mg/dL   eGFR Non African Am 61 mL/min/1.73   Globulin 2.7 gm/dL   A/G Ratio 1.6 g/dL   BUN/Creatinine Ratio 16.9    Anion Gap 14.0 mmol/L         Procedure Component Value Units Date/Time   MRI Brain Without Contrast [[id number]] Clifford as Reviewed   Order Status: Completed Collected: 03/23/20 0948    Updated: 03/23/20 1026   Narrative:     EXAM DESCRIPTION:     MRI BRAIN WO CONTRAST    CLINICAL HISTORY:     54 years  Male  Stroke, I63.9 Cerebral infarction, unspecified  R53.1 Weakness R47.1 Dysarthria and anarthria    COMPARISON:     CT of the head March 23, 2020    TECHNIQUE:     Multiplanar spin echo sequences were obtained in high field  magnet. No IV contrast was administered.      FINDINGS:     There is no evidence of an intracranial mass or hemorrhage. The  ventricles are normal in size and configuration. No extra-axial  fluid collections are seen. There is very mild generalized  cortical atrophy noted.    Diffusion-weighted images demonstrate no evidence of acute  ischemia. Intracranial flow voids are noted and unremarkable.    There are small mucous retention cyst noted involving both  maxillary sinuses compatible with chronic maxillary sinusitis.   Impression:         1. No radiographic evidence of acute intracranial pathology is no  evidence of acute ischemia.  2. Findings compatible with chronic bilateral maxillary  sinusitis.        Electronically signed by:  Bailee Siddiqui MD  3/23/2020 10:24 AM  CDT Workstation: [id number]   XR Chest 1 View [[id number]] Clifford as Reviewed   Order Status: Completed Collected: 03/23/20 0216    Updated: 03/23/20 0221   Narrative:     History:  Acute Stroke Protocol (onset < 12 hrs)    Procedure: XR CHEST 1 VIEW    Comparison:  March 12, 2020    Findings:  Portable view of the chest was obtained at 2:03 AM.   The heart size is normal.  Lungs appear clear of active disease.   There is an incidental calcified granuloma again noted within the  right lung base. There is no definite pleural effusion or  pneumothorax.   Impression:     Impression:  No acute abnormality as above.     Electronically signed by:  Tony Fernandez MD   3/23/2020 2:20 AM CDT  Workstation: [id number]   CT Angiogram Neck [[id number]] Clifford as Reviewed   Order Status: Completed Collected: 03/23/20 0201    Updated: 03/23/20 0238   Narrative:     HISTORY:  cva    TECHNIQUE: Postcontrast angiographic imaging with 3-D  postprocessing imaging and multiplanar reformatted images of the  head and neck was performed utilizing 90 mL Isovue-370.    This exam was performed according to our departmental  dose-optimization program, which includes automated exposure  control, adjustment of the mA and/or kV according to patient size  and/or use of iterative reconstruction technique.    COMPARISON: None    FINDINGS:      CTA HEAD: No significant plaque formation is identified.  There  is no evidence of significant stenosis or aneurysm.  The [place]  of [place] appears intact.  There is normal enhancement of the  dural venous sinuses.    CTA NECK: Normal-appearing aortic arch anatomy is noted.  The  origins of the great vessels are widely patent.  Carotid and  vertebral arteries are widely patent.  The patient is right  vertebral arterial dominant.  No discrete neck mass is  identified.  The visualized lung apices are clear.    There is incidental mild bilateral maxillary sinus mucosal  thickening without air-fluid level.   Impression:     No significant stenosis as above.    Electronically signed by:  Tony Fernandez MD  3/23/2020 2:36 AM CDT  Workstation: [id number]   CT Angiogram Head [[id number]] Clifford as Reviewed   Order Status: Completed Collected: 03/23/20 0201    Updated: 03/23/20 0241   Addenda:        ADDENDUM   ADDENDUM #1       Preliminary reports for CTs of the head and CTA head and neck were called to Dr. Murillo on March 23, 2020 at approximately 2:38 AM CST.   Electronically signed by:  Tony Fernandez MD  3/23/2020 2:40 AM CDT Workstation: [id number]   Signed: 03/23/20 0240 by Tony Fernandez MD   Narrative:     HISTORY:  cva    TECHNIQUE: Postcontrast angiographic imaging  with 3-D  postprocessing imaging and multiplanar reformatted images of the  head and neck was performed utilizing 90 mL Isovue-370.    This exam was performed according to our departmental  dose-optimization program, which includes automated exposure  control, adjustment of the mA and/or kV according to patient size  and/or use of iterative reconstruction technique.    COMPARISON: None    FINDINGS:      CTA HEAD: No significant plaque formation is identified.  There  is no evidence of significant stenosis or aneurysm.  The [place]  of [place] appears intact.  There is normal enhancement of the  dural venous sinuses.    CTA NECK: Normal-appearing aortic arch anatomy is noted.  The  origins of the great vessels are widely patent.  Carotid and  vertebral arteries are widely patent.  The patient is right  vertebral arterial dominant.  No discrete neck mass is  identified.  The visualized lung apices are clear.    There is incidental mild bilateral maxillary sinus mucosal  thickening without air-fluid level.   Impression:     No significant stenosis as above.    Electronically signed by:  Tony Fernandez MD  3/23/2020 2:36 AM CDT  Workstation: [id number]   CT Head Without Contrast Stroke Protocol [[id number]] Clifford as Reviewed   Order Status: Completed Collected: 03/23/20 0152    Updated: 03/23/20 0228   Narrative:     HISTORY: cva, left sided weakness    COMPARISON: January 13, 2018    TECHNIQUE: Multiple contiguous axial images of the head were  obtained.      This exam was performed according to our departmental  dose-optimization program, which includes automated exposure  control, adjustment of the mA and/or kV according to patient size  and/or use of iterative reconstruction technique.    CONTRAST: None    FINDINGS:    Brain parenchyma: There is no evidence of acute intracranial  hemorrhage, abnormal mass effect, or major vascular territorial  infarct.    Ventricles and extra-axial spaces: The ventricular system  and  extra axial spaces appear within normal limits.    Calvarium and skull base: The calvarium appears intact.    Paranasal sinuses and mastoids: Clear as visualized   Impression:     No acute abnormality as above. If clinical concern  exists regarding an acute ischemic/vascular etiology being  responsible for patient's symptomatology, an MRI of the brain is  more sensitive than the current study, in ruling out such a  possibility.    Electronically signed by:  Tony Fernandez MD  3/23/2020 2:27 AM CDT  Workstation: [id number]       Chief Complaint on Day of Discharge: None    Hospital Course:  The patient is a 54 y.o. male with past medical history of HTN, HLD,migraine HA,and  tobacco abuse who presented with complaints of waking up with left arm and leg numbness that has gotten slightly better. He stated that he went to bed around 9pm. He did not feel well at the time but did not have any focal seizures. He woke up at midnight with his left arm and leg numb and he could not move them. He was gradually able to move them although he did not feel completely at baseline. He waited to see if the symptoms would get better and when they did not he came to ER, drove himself, to be evaluated.     Of note, he reported similar symptoms about a week prior. He had numbness that lasted about 30 min, but he still felt weak, especially in the left leg that caused him to stumble the rest of the day before it resolved.    On arrival to the ER, he underwent CT scan head which did not show any acute abnormality. CT angiogram of the neck and head were also unremarkable. Telemetry neurology was contacted by the ER and patient was admitted to the hospitalist service for further evaluation and management.  He was started on dual antiplatelet with aspirin and Plavix.  He also was started on Lipitor. His blood pressure was elevated and his antihypertensive medications were adjusted.  He had an MRI brain that was negative for any acute  "lesion but neurology felt that clinically his symptoms were comp consistent with right thalamic stroke.  H the head and neck also showed mild cerebral atherosclerosis.  Echocardiogram was negative.  He was discharged after he was evaluated by physical and Occupational Therapy and cleared.  Patient was instructed to continue aspirin and Plavix for 1 month and then stop Plavix and continue aspirin 81 mg daily indefinitely.    Condition on Discharge: Stable    Physical Exam on Discharge:  /76 (BP Location: Left arm, Patient Position: Lying)   Pulse 51   Temp 97.3 °F (36.3 °C) (Axillary)   Resp 16   Ht 177.8 cm (70\")   Wt 97.8 kg (215 lb 9.8 oz)   SpO2 97%   BMI 30.94 kg/m²      Physical Exam  Constitutional: He is oriented to person, place, and time. He appears well-developed and well-nourished. No distress.   HENT:   Head: Normocephalic and atraumatic.   Mouth/Throat: Oropharynx is clear and moist. No oropharyngeal exudate.   Eyes: Pupils are equal, round, and reactive to light. Conjunctivae and EOM are normal. No scleral icterus.   Neck: Normal range of motion. Neck supple. No JVD present. No tracheal deviation present. No thyromegaly present.   Cardiovascular: Normal rate, regular rhythm, normal heart sounds and intact distal pulses. Exam reveals no gallop and no friction rub.   No murmur heard.  Pulmonary/Chest: Effort normal and breath sounds normal. No stridor. No respiratory distress. He has no wheezes. He has no rales. He exhibits no tenderness.   Abdominal: Soft. Bowel sounds are normal. He exhibits no distension and no mass. There is no tenderness. There is no rebound and no guarding. No hernia.   Musculoskeletal: Normal range of motion. He exhibits no edema, tenderness or deformity.   Lymphadenopathy:     He has no cervical adenopathy.   Neurological: He is alert and oriented to person, place, and time. No cranial nerve deficit. He exhibits normal muscle tone.   Skin: Skin is warm and dry. No " rash noted. He is not diaphoretic. No erythema. No pallor.   Psychiatric: He has a normal mood and affect. His behavior is normal. Judgment and thought content normal.     Discharge Disposition:  Home or Self Care    Discharge Medications:     Discharge Medications      New Medications      Instructions Start Date   clopidogrel 75 MG tablet  Commonly known as:  PLAVIX   75 mg, Oral, Daily         Changes to Medications      Instructions Start Date   atorvastatin 80 MG tablet  Commonly known as:  LIPITOR  What changed:    · medication strength  · how much to take   80 mg, Oral, Nightly         Continue These Medications      Instructions Start Date   aspirin 81 MG EC tablet   81 mg, Oral, Daily      carvedilol 12.5 MG tablet  Commonly known as:  COREG   12.5 mg, Oral, 2 Times Daily With Meals      famotidine 40 MG tablet  Commonly known as:  PEPCID   40 mg, Oral, Nightly      hydroCHLOROthiazide 12.5 MG capsule  Commonly known as:  MICROZIDE   12.5 mg, Oral, Daily      ibuprofen 600 MG tablet  Commonly known as:  ADVIL,MOTRIN   600 mg, Oral, Every 6 Hours PRN      lisinopril 40 MG tablet  Commonly known as:  PRINIVIL,ZESTRIL   40 mg, Oral, Daily      pantoprazole 40 MG EC tablet  Commonly known as:  PROTONIX   40 mg, Oral, Daily      SUMAtriptan 50 MG tablet  Commonly known as:  IMITREX   50 mg, Oral, Every 2 Hours PRN, Take one tablet at onset of headache. May repeat dose one time in 2 hours if headache not relieved.       topiramate 25 MG tablet  Commonly known as:  TOPAMAX   25 mg, Oral, 2 Times Daily         Stop These Medications    amLODIPine 10 MG tablet  Commonly known as:  NORVASC            Discharge Diet:   Diet Instructions     Diet: Regular, Cardiac      Discharge Diet:   Regular  Cardiac             Activity at Discharge:   Activity Instructions     Activity as Tolerated            Discharge Care Plan/Instructions:   1.  Follow-up with your primary care provider within 1 week of discharge.   2.  Take  aspirin 81 mg daily indefinitely to prevent stroke.  Continue Plavix 75 mg daily for the next 30 days until April 24, 2020 then you can stop this medication.   3.  Stop taking your amlodipine 10 mg daily home medication for now due to your blood pressure being normal.  You can restart this medication at home if your blood pressure is elevated or if your primary care provider instructs you to do so. You should continue on lisinopril and hydrochlorothiazide for your blood pressure   4.  Quit smoking cigarettes for your own health and to prevent future stroke.    Follow-up Appointments:   No future appointments.    Test Results Pending at Discharge:     Zaynab Cohen MD  03/27/20  07:49    Time: 32 minutes of time was spent evaluating patient and planning discharge.      Part of this note may be an electronic transcription/translation of spoken language to printed text using the Dragon Dictation system.

## 2020-03-25 ENCOUNTER — READMISSION MANAGEMENT (OUTPATIENT)
Dept: CALL CENTER | Facility: HOSPITAL | Age: 55
End: 2020-03-25

## 2020-03-25 NOTE — OUTREACH NOTE
Prep Survey      Responses   [facility] discharged from?  [place]   Is LACE score < 7 ?  Yes   Eligibility  Readm Mgmt   Discharge diagnosis   acute CVA   Does the patient have one of the following disease processes/diagnoses(primary or secondary)?  Stroke (TIA)   Does the patient have Home health ordered?  No   Is there a DME ordered?  No   Prep survey completed?  Yes          Arabella Alford RN

## 2020-03-30 ENCOUNTER — READMISSION MANAGEMENT (OUTPATIENT)
Dept: CALL CENTER | Facility: HOSPITAL | Age: 55
End: 2020-03-30

## 2020-03-30 NOTE — OUTREACH NOTE
Stroke Week 1 Survey      Responses   [facility] patient discharged from?  [place]   Does the patient have one of the following disease processes/diagnoses(primary or secondary)?  Stroke (TIA)   Is there a successful TCM telephone encounter documented?  No   Week 1 attempt successful?  Yes   Call start time  1643   Call end time  1654   Discharge diagnosis   acute CVA   Is patient permission given to speak with other caregiver?  Yes   List who call center can speak with  Katie iRng sig other   Person spoke with today (if not patient) and relationship  Katie, sig other   Meds reviewed with patient/caregiver?  Yes   Is the patient having any side effects they believe may be caused by any medication additions or changes?  No   Does the patient have all medications ordered at discharge?  Yes   Is the patient taking all medications as directed (includes completed medication regime)?  Yes   Does the patient have a primary care provider?   Yes   Does the patient have an appointment with their PCP within 7 days of discharge?  Greater than 7 days   Comments regarding PCP  Becky Khan, DO PCP   Nursing Interventions  Verified appointment date/time/provider   Has the patient kept scheduled appointments due by today?  N/A   Has home health visited the patient within 72 hours of discharge?  N/A   Psychosocial issues?  No   Does the patient require any assistance with activities of daily living such as eating, bathing, dressing, walking, etc.?  No   Does the patient have any residual symptoms from stroke/TIA?  Yes   Residual symptoms comments  Mild weakness   Comments  Sig other reports that patient is back to work today.    Did the patient receive a copy of their discharge instructions?  Yes   Nursing interventions  Reviewed instructions with patient   What is the patient's perception of their health status since discharge?  Improving   Nursing interventions  Nurse provided patient education   Is the  patient able to teach back FAST for Stroke?  Yes   Is the patient/caregiver able to teach back the risk factors for a stroke?  High blood pressure-goal below 120/80, High Cholesterol, Smoking   Is the patient/caregiver able to teach back signs and symptoms related to disease process for when to call PCP?  Yes   Is the patient/caregiver able to teach back signs and symptoms related to disease process for when to call 911?  Yes   If the patient is a current smoker, are they able to teach back resources for cessation?  -- [Sig other states that patient is cutting back on smoking. Reviewed importance of not smoking for stroke prevention. ]   Is the patient/caregiver able to teach back the hierarchy of who to call/visit for symptoms/problems? PCP, Specialist, Home health nurse, Urgent Care, ED, 911  Yes   Week 1 call completed?  Yes          Laurie Coker RN

## 2020-04-06 ENCOUNTER — READMISSION MANAGEMENT (OUTPATIENT)
Dept: CALL CENTER | Facility: HOSPITAL | Age: 55
End: 2020-04-06

## 2020-04-13 ENCOUNTER — READMISSION MANAGEMENT (OUTPATIENT)
Dept: CALL CENTER | Facility: HOSPITAL | Age: 55
End: 2020-04-13

## 2020-04-13 NOTE — OUTREACH NOTE
Stroke Week 3 Survey      Responses   [facility] [place] patient discharged from?  [place]   Does the patient have one of the following disease processes/diagnoses(primary or secondary)?  Stroke (TIA)   Week 3 attempt successful?  Yes   Call start time  1252   Call end time  1253   Discharge diagnosis   acute CVA   What is the patient's perception of their health status since discharge?  Improving   Nursing interventions  Nurse provided patient education   Is the patient able to teach back FAST for Stroke?  Yes   Is the patient/caregiver able to teach back signs and symptoms related to disease process for when to call PCP?  Yes   Is the patient/caregiver able to teach back signs and symptoms related to disease process for when to call 911?  Yes   Is the patient/caregiver able to teach back the hierarchy of who to call/visit for symptoms/problems? PCP, Specialist, Home health nurse, Urgent Care, ED, 911  Yes   Additional teach back comments  Patient says he is doing really well, he is struggling with being laid off from work but his health is better.   Week 3 call completed?  Yes          Mary Augustin RN

## 2020-04-20 ENCOUNTER — READMISSION MANAGEMENT (OUTPATIENT)
Dept: CALL CENTER | Facility: HOSPITAL | Age: 55
End: 2020-04-20

## 2020-04-20 NOTE — OUTREACH NOTE
Stroke Week 4 Survey      Responses   [facility] patient discharged from?  [place]   Does the patient have one of the following disease processes/diagnoses(primary or secondary)?  Stroke (TIA)   Week 4 attempt successful?  Yes   Call start time  1341   Call end time  1346   Discharge diagnosis   acute CVA   Meds reviewed with patient/caregiver?  Yes   Is the patient taking all medications as directed (includes completed medication regime)?  Yes   Has the patient kept scheduled appointments due by today?  N/A   Comments  telephone visits   Is the patient still receiving Home Health Services?  N/A   Psychosocial issues?  No   Does the patient require any assistance with activities of daily living such as eating, bathing, dressing, walking, etc.?  No   Does the patient have any residual symptoms from stroke/TIA?  Yes   Does the patient understand the diet ordered at discharge?  Yes   What is the patient's perception of their health status since discharge?  Improving   Is the patient able to teach back FAST for Stroke?  Yes   Is the patient/caregiver able to teach back the risk factors for a stroke?  High blood pressure-goal below 120/80, High Cholesterol, Smoking   Is the patient/caregiver able to teach back signs and symptoms related to disease process for when to call PCP?  Yes   Is the patient/caregiver able to teach back signs and symptoms related to disease process for when to call 911?  Yes   If the patient is a current smoker, are they able to teach back resources for cessation?  Smoking cessation medications   Is the patient/caregiver able to teach back the hierarchy of who to call/visit for symptoms/problems? PCP, Specialist, Home health nurse, Urgent Care, ED, 911  Yes   Additional teach back comments  Pt has cut back on smoking and does not use patches or lozenges   Week 4 Call Completed?  Yes   Would the patient like one additional call?  No   Graduated  Yes   Did the patient feel the follow up  calls were helpful during their recovery period?  Yes   Was the number of calls appropriate?  Yes          Susana Stearns RN

## 2020-06-22 NOTE — PROGRESS NOTES
[facility]  INPATIENT PROGRESS NOTE    Length of Stay: 0  Date of Admission: 3/23/2020  Primary Care Physician: Becky Khan DO    Subjective   Chief Complaint: Left-sided weakness and numbness.    HPI:   Patient presented with sudden onset of left-sided numbness and weakness started about a week ago.  He is being managed for right thalamic lacunar stroke.    This morning, patient has improvement in his left-sided weakness.  He has slight    Review of Systems   Constitutional: Negative for activity change, appetite change, chills, fatigue and fever.   HENT: Negative for congestion, sore throat and trouble swallowing.    Respiratory: Negative for cough, chest tightness, shortness of breath and wheezing.    Cardiovascular: Negative for chest pain, palpitations and leg swelling.   Gastrointestinal: Negative for abdominal distention, abdominal pain, diarrhea, nausea and vomiting.   Genitourinary: Negative for difficulty urinating, dysuria and hematuria.   Musculoskeletal: Negative for arthralgias, back pain and myalgias.   Skin: Negative for pallor and rash.   Neurological: Positive for weakness. Negative for dizziness, syncope, light-headedness and headaches.   Hematological: Negative for adenopathy. Does not bruise/bleed easily.   Psychiatric/Behavioral: Negative for agitation and confusion. The patient is not nervous/anxious.      Objective    Temp:  [97.7 °F (36.5 °C)-98.2 °F (36.8 °C)] 97.8 °F (36.6 °C)  Heart Rate:  [54-78] 60  Resp:  [18-20] 18  BP: (120-165)/(58-96) 146/84    Physical Exam   Constitutional: He is oriented to person, place, and time. He appears well-developed and well-nourished. No distress.   HENT:   Head: Normocephalic and atraumatic.   Mouth/Throat: Oropharynx is clear and moist. No oropharyngeal exudate.   Eyes: Pupils are equal, round, and reactive to light. Conjunctivae and EOM are normal. No scleral icterus.   Neck: Normal  Please call to schedule a video visit.   range of motion. Neck supple. No JVD present. No tracheal deviation present. No thyromegaly present.   Cardiovascular: Normal rate, regular rhythm, normal heart sounds and intact distal pulses. Exam reveals no gallop and no friction rub.   No murmur heard.  Pulmonary/Chest: Effort normal and breath sounds normal. No stridor. No respiratory distress. He has no wheezes. He has no rales. He exhibits no tenderness.   Abdominal: Soft. Bowel sounds are normal. He exhibits no distension and no mass. There is no tenderness. There is no rebound and no guarding. No hernia.   Musculoskeletal: Normal range of motion. He exhibits no edema, tenderness or deformity.   Lymphadenopathy:     He has no cervical adenopathy.   Neurological: He is alert and oriented to person, place, and time. No cranial nerve deficit. He exhibits normal muscle tone.   Skin: Skin is warm and dry. No rash noted. He is not diaphoretic. No erythema. No pallor.   Psychiatric: He has a normal mood and affect. His behavior is normal. Judgment and thought content normal.     Medication Review:    Current Facility-Administered Medications:   •  [START ON 3/24/2020] aspirin chewable tablet 81 mg, 81 mg, Oral, Daily **OR** [START ON 3/24/2020] aspirin suppository 300 mg, 300 mg, Rectal, Daily, Laverne Pressley APRN  •  atorvastatin (LIPITOR) tablet 80 mg, 80 mg, Oral, Nightly, Laverne Pressley APRN, 80 mg at 03/23/20 0403  •  clopidogrel (PLAVIX) tablet 75 mg, 75 mg, Oral, Daily, Laverne Pressley APRN, 75 mg at 03/23/20 0857  •  pantoprazole (PROTONIX) EC tablet 40 mg, 40 mg, Oral, Daily, Radha Street MD, 40 mg at 03/23/20 0857  •  sodium chloride 0.9 % flush 10 mL, 10 mL, Intravenous, PRN, Hemanth Murillo MD  •  sodium chloride 0.9 % flush 10 mL, 10 mL, Intravenous, Q12H, Laverne Pressley APRN, 10 mL at 03/23/20 0857  •  sodium chloride 0.9 % infusion, 150 mL/hr, Intravenous, Continuous, Laverne Pressley, APRN, Last  Rate: 150 mL/hr at 03/23/20 1815, 150 mL/hr at 03/23/20 1815  •  topiramate (TOPAMAX) tablet 25 mg, 25 mg, Oral, BID, Radha Street MD, 25 mg at 03/23/20 0857    Results Review:  I have reviewed the labs, radiology results, and diagnostic studies.    Laboratory Data:   Results from last 7 days   Lab Units 03/23/20  0141   SODIUM mmol/L 139   POTASSIUM mmol/L 3.9   CHLORIDE mmol/L 104   CO2 mmol/L 21.0*   BUN mg/dL 21*   CREATININE mg/dL 1.24   GLUCOSE mg/dL 119*   CALCIUM mg/dL 9.8   BILIRUBIN mg/dL 0.2   ALK PHOS U/L 84   ALT (SGPT) U/L 33   AST (SGOT) U/L 20   ANION GAP mmol/L 14.0     Estimated Creatinine Clearance: 81.5 mL/min (by C-G formula based on SCr of 1.24 mg/dL).          Results from last 7 days   Lab Units 03/23/20  0141   WBC 10*3/mm3 8.30   HEMOGLOBIN g/dL 14.2   HEMATOCRIT % 40.0   PLATELETS 10*3/mm3 173     Results from last 7 days   Lab Units 03/23/20  0141   INR  1.00       Culture Data:   No results found for: BLOODCX  No results found for: URINECX  No results found for: RESPCX  No results found for: WOUNDCX  No results found for: STOOLCX  No components found for: BODYFLD    Radiology Data:   Imaging Results (Last 24 Hours)     Procedure Component Value Units Date/Time    MRI Brain Without Contrast [[id number]] Collected:  03/23/20 0948     Updated:  03/23/20 1026    Narrative:       EXAM DESCRIPTION:     MRI BRAIN WO CONTRAST    CLINICAL HISTORY:     54 years  Male  Stroke, I63.9 Cerebral infarction, unspecified  R53.1 Weakness R47.1 Dysarthria and anarthria    COMPARISON:     CT of the head March 23, 2020    TECHNIQUE:     Multiplanar spin echo sequences were obtained in high field  magnet. No IV contrast was administered.      FINDINGS:     There is no evidence of an intracranial mass or hemorrhage. The  ventricles are normal in size and configuration. No extra-axial  fluid collections are seen. There is very mild generalized  cortical atrophy noted.    Diffusion-weighted images demonstrate  no evidence of acute  ischemia. Intracranial flow voids are noted and unremarkable.    There are small mucous retention cyst noted involving both  maxillary sinuses compatible with chronic maxillary sinusitis.      Impression:           1. No radiographic evidence of acute intracranial pathology is no  evidence of acute ischemia.  2. Findings compatible with chronic bilateral maxillary  sinusitis.        Electronically signed by:  Bailee Siddiqui MD  3/23/2020 10:24 AM  CDT Workstation: [id number]    CT Angiogram Head [[id number]] Collected:  03/23/20 0201     Updated:  03/23/20 0241    Addenda:         ADDENDUM   ADDENDUM #1       Preliminary reports for CTs of the head and CTA head and neck  were called to Dr. Murillo on March 23, 2020 at approximately  2:38 AM CST.    Electronically signed by:  Tony Fernandez MD  3/23/2020 2:40 AM CDT  Workstation: [id number]    Signed:  03/23/20 0240 by Tony Fernandez MD    Narrative:       HISTORY:  cva    TECHNIQUE: Postcontrast angiographic imaging with 3-D  postprocessing imaging and multiplanar reformatted images of the  head and neck was performed utilizing 90 mL Isovue-370.    This exam was performed according to our departmental  dose-optimization program, which includes automated exposure  control, adjustment of the mA and/or kV according to patient size  and/or use of iterative reconstruction technique.    COMPARISON: None    FINDINGS:      CTA HEAD: No significant plaque formation is identified.  There  is no evidence of significant stenosis or aneurysm.  The [place] appears intact.  There is normal enhancement of the  dural venous sinuses.    CTA NECK: Normal-appearing aortic arch anatomy is noted.  The  origins of the great vessels are widely patent.  Carotid and  vertebral arteries are widely patent.  The patient is right  vertebral arterial dominant.  No discrete neck mass is  identified.  The visualized lung apices are clear.    There is incidental mild  bilateral maxillary sinus mucosal  thickening without air-fluid level.      Impression:       No significant stenosis as above.    Electronically signed by:  Tony Fernandez MD  3/23/2020 2:36 AM CDT  Workstation: [id number]    CT Angiogram Neck [[id number]] Collected:  03/23/20 0201     Updated:  03/23/20 0238    Narrative:       HISTORY:  cva    TECHNIQUE: Postcontrast angiographic imaging with 3-D  postprocessing imaging and multiplanar reformatted images of the  head and neck was performed utilizing 90 mL Isovue-370.    This exam was performed according to our departmental  dose-optimization program, which includes automated exposure  control, adjustment of the mA and/or kV according to patient size  and/or use of iterative reconstruction technique.    COMPARISON: None    FINDINGS:      CTA HEAD: No significant plaque formation is identified.  There  is no evidence of significant stenosis or aneurysm.  The [place] appears intact.  There is normal enhancement of the  dural venous sinuses.    CTA NECK: Normal-appearing aortic arch anatomy is noted.  The  origins of the great vessels are widely patent.  Carotid and  vertebral arteries are widely patent.  The patient is right  vertebral arterial dominant.  No discrete neck mass is  identified.  The visualized lung apices are clear.    There is incidental mild bilateral maxillary sinus mucosal  thickening without air-fluid level.      Impression:       No significant stenosis as above.    Electronically signed by:  Tony Fernandez MD  3/23/2020 2:36 AM CDT  Workstation: [id number]    CT Head Without Contrast Stroke Protocol [[id number]] Collected:  03/23/20 0152     Updated:  03/23/20 0228    Narrative:       HISTORY: cva, left sided weakness    COMPARISON: January 13, 2018    TECHNIQUE: Multiple contiguous axial images of the head were  obtained.      This exam was performed according to our departmental  dose-optimization program, which includes automated  exposure  control, adjustment of the mA and/or kV according to patient size  and/or use of iterative reconstruction technique.    CONTRAST: None    FINDINGS:    Brain parenchyma: There is no evidence of acute intracranial  hemorrhage, abnormal mass effect, or major vascular territorial  infarct.    Ventricles and extra-axial spaces: The ventricular system and  extra axial spaces appear within normal limits.    Calvarium and skull base: The calvarium appears intact.    Paranasal sinuses and mastoids: Clear as visualized      Impression:       No acute abnormality as above. If clinical concern  exists regarding an acute ischemic/vascular etiology being  responsible for patient's symptomatology, an MRI of the brain is  more sensitive than the current study, in ruling out such a  possibility.    Electronically signed by:  Tony Fernandez MD  3/23/2020 2:27 AM CDT  Workstation: [id number]    XR Chest 1 View [[id number]] Collected:  03/23/20 0216     Updated:  03/23/20 0221    Narrative:       History:  Acute Stroke Protocol (onset < 12 hrs)    Procedure: XR CHEST 1 VIEW    Comparison:  March 12, 2020    Findings:  Portable view of the chest was obtained at 2:03 AM.   The heart size is normal.  Lungs appear clear of active disease.   There is an incidental calcified granuloma again noted within the  right lung base. There is no definite pleural effusion or  pneumothorax.      Impression:       Impression:  No acute abnormality as above.     Electronically signed by:  Tony Fernandez MD  3/23/2020 2:20 AM CDT  Workstation: [id number]          I have reviewed the patient's current medications.     Assessment/Plan     Hospital Problem List:  Active Problems:    Cerebrovascular accident (CVA) (CMS/[facility])    Plan  1.  Right thalamic stroke-lacunar: Continue dual antiplatelet therapy and full dose statin.  MRI brain this morning shows no acute ischemia.  Echocardiogram shows EF of 51% with no PFO or intracardiac shunt.  Will have PT  and OT evaluation in the morning and plan to discharge in the next 24 hours.  Patient passed swallow evaluation.    2.  Essential hypertension: Allow permissive hypertension for now.  Hold blood pressure medications.    3.  Smoker: Patient has been counseled to quit smoking.    DVT prophylaxis with SCDs    CODE STATUS is full code    Discharge Planning: I expect patient to be discharged in the next 24 hours.    Zaynab Cohen MD   03/23/20   19:23

## 2020-07-13 ENCOUNTER — TRANSCRIBE ORDERS (OUTPATIENT)
Dept: PHYSICAL THERAPY | Facility: HOSPITAL | Age: 55
End: 2020-07-13

## 2020-07-13 DIAGNOSIS — M54.50 ACUTE LEFT-SIDED LOW BACK PAIN WITHOUT SCIATICA: Primary | ICD-10-CM

## 2020-07-17 ENCOUNTER — HOSPITAL ENCOUNTER (OUTPATIENT)
Dept: PHYSICAL THERAPY | Facility: HOSPITAL | Age: 55
Setting detail: THERAPIES SERIES
Discharge: HOME | End: 2020-07-17

## 2020-07-17 DIAGNOSIS — M54.50 ACUTE LEFT-SIDED LOW BACK PAIN WITHOUT SCIATICA: Primary | ICD-10-CM

## 2020-07-17 PROCEDURE — 97161 PT EVAL LOW COMPLEX 20 MIN: CPT | Performed by: PHYSICAL THERAPIST

## 2020-07-17 PROCEDURE — 97110 THERAPEUTIC EXERCISES: CPT | Performed by: PHYSICAL THERAPIST

## 2020-07-17 NOTE — THERAPY EVALUATION
Outpatient Physical Therapy Ortho Initial Evaluation  [facility]     Patient Name: Primo Mesa  : 1965  MRN: [id number]  Today's Date: 2020      Visit Date: 2020  Attendance:  20v/yr  Subjective Improvement: NA  Next MD Appt:   Recert Date: 20    Therapy Diagnosis: chronic left sided low back pain extending into left buttock  Medications (Admitted on 2020)     aspirin 81 MG EC tablet     atorvastatin (LIPITOR) 80 MG tablet     carvedilol (COREG) 12.5 MG tablet     clopidogrel (PLAVIX) 75 MG tablet     famotidine (PEPCID) 40 MG tablet     hydrochlorothiazide (MICROZIDE) 12.5 MG capsule     ibuprofen (ADVIL,MOTRIN) 600 MG tablet     lisinopril (PRINIVIL,ZESTRIL) 40 MG tablet     pantoprazole (PROTONIX) 40 MG EC tablet     SUMAtriptan (IMITREX) 50 MG tablet     topiramate (TOPAMAX) 25 MG tablet        Patient Active Problem List   Diagnosis   • Acute bronchitis   • Cerebrovascular accident (CVA) (CMS/[facility])        Past Medical History:   Diagnosis Date   • Chest pain    • Dehydration    • Dizziness    • Hypertension         Past Surgical History:   Procedure Laterality Date   • APPENDECTOMY     • LEG SURGERY         Visit Dx:     ICD-10-CM ICD-9-CM   1. Acute left-sided low back pain without sciatica M54.5 724.2         Patient History     Row Name 20 0800             History    Chief Complaint  Pain  -SW      Type of Pain  Back pain  -SW      Brief Description of Current Complaint  Patient reports gradual onset central low back pain that extends into left buttock. He denies LE pain or N/T. He has worked in [employer] most of his life and feels this has contributed to pain. Patient is not currently working due to back pain, stroke in 2020, and the pandemic. He has very mild residual weakness on left side. He takes care of yard. Using weed eater is difficult. Heavy lifting also aggravates symptoms.   -SW      Patient/Caregiver Goals  Relieve pain;Return to prior  level of function  -SW         Pain     Pain at Present  5  -SW      Pain at Worst  10  -SW      Is your sleep disturbed?  Yes  -SW      Is medication used to assist with sleep?  No  -SW      Total hours of sleep per night  2-3 hours at one time  -SW      Difficulties at work?  yes  -SW      Difficulties with ADL's?  yes  -SW         Services    Prior Rehab/Home Health Experiences  No  -SW      Are you currently receiving Home Health services  No  -SW      Do you plan to receive Home Health services in the near future  No  -SW         Daily Activities    Primary Language  English  -SW      Barriers to learning  None  -SW      Pt Participated in POC and Goals  Yes  -SW         Safety    Are you being hurt, hit, or frightened by anyone at home or in your life?  No  -SW      Are you being neglected by a caregiver  No  -SW        User Key  (r) = Recorded By, (t) = Taken By, (c) = Cosigned By    Initials Name Provider Type    SW Ilene Quintana Physical Therapist          PT Ortho     Row Name 07/17/20 0800       Subjective Comments    Subjective Comments  Patient reports gradual onset central low back pain that extends into left buttock. He denies LE pain or N/T. He has worked in [employer] most of his life and feels this has contributed to pain. Patient is not currently working due to back pain, stroke in March 2020, and the pandemic. He has very mild residual weakness on left side. He takes care of yard. Using weed eater is difficult. Heavy lifting also aggravates symptoms.   -SW       Subjective Pain    Able to rate subjective pain?  yes  -SW    Pre-Treatment Pain Level  5  -SW       Posture/Observations    Posture/Observations Comments  no antalgia  -SW       Special Tests/Palpation    Special Tests/Palpation  -- -SLR B  -SW       General ROM    GENERAL ROM COMMENTS  B hip PROM WFL's  -SW       Head/Neck/Trunk    Trunk Extension AROM  10%  -SW    Trunk Flexion AROM  90% painful  -SW    Trunk Lt Lateral Flexion AROM  75%  painful  -SW    Trunk Rt Lateral Flexion AROM  75%  -SW       MMT Right Lower Ext    Rt Hip Flexion MMT, Gross Movement  (5/5) normal  -SW    Rt Hip Extension MMT, Gross Movement  (3/5) fair  -SW    Rt Hip ABduction MMT, Gross Movement  (5/5) normal  -SW    Rt Hip Internal (Medial) Rotation MMT, Gross Movement  (4/5) good  -SW    Rt Hip External (Lateral) Rotation MMT, Gross Movement  (5/5) normal  -SW    Rt Knee Extension MMT, Gross Movement  (5/5) normal  -SW    Rt Knee Flexion MMT, Gross Movement  (5/5) normal  -SW    Rt Ankle Plantarflexion MMT, Gross Movement  (5/5) normal  -SW    Rt Ankle Dorsiflexion MMT, Gross Movement  (5/5) normal  -SW    Rt Ankle Subtalar Eversion MMT, Gross Movement  (5/5) normal  -SW    Rt MTP Extension MMT, Gross Movement  (5/5) normal  -SW       MMT Left Lower Ext    Lt Hip Flexion MMT, Gross Movement  (5/5) normal  -SW    Lt Hip Extension MMT, Gross Movement  (3/5) fair  -SW    Lt Hip ABduction MMT, Gross Movement  (5/5) normal  -SW    Lt Hip Internal (Medial) Rotation MMT, Gross Movement  (4/5) good  -SW    Lt Hip External (Lateral) Rotation MMT, Gross Movement  (5/5) normal  -SW    Lt Knee Extension MMT, Gross Movement  (4+/5) good plus  -SW    Lt Knee Flexion MMT, Gross Movement  (5/5) normal  -SW    Lt Ankle Plantarflexion MMT, Gross Movement  (5/5) normal  -SW    Lt Ankle Dorsiflexion MMT, Gross Movement  (5/5) normal  -SW    Lt Ankle Subtalar Eversion MMT, Gross Movement  (5/5) normal  -SW    Lt MTP Extension MMT, Gross Movement  (5/5) normal  -SW      User Key  (r) = Recorded By, (t) = Taken By, (c) = Cosigned By    Initials Name Provider Type    Ilene Cerda Physical Therapist                      Therapy Education  Education Details: HEP per flowsheet today to be performed 3x/day  Given: HEP, Symptoms/condition management  Program: New  How Provided: Verbal, Demonstration, Written  Provided to: Patient  Level of Understanding: Teach back education performed, Verbalized,  "Demonstrated     PT OP Goals     Row Name 07/17/20 0800          PT Short Term Goals    STG Date to Achieve  08/07/20  -     STG 1  Patient will be independent with HEP.   -     STG 2  Perform 5x sit to stand.   -        Long Term Goals    LTG Date to Achieve  08/28/20  -     LTG 1  Patient will score 22% on modified Oswestry.   -     LTG 2  Patient will exhibit 100% active flexion and sidebending and 50% extension at trunk.   -     LTG 3  Patient will exhibit 5/5 bilateral hip and IR strength.   -     LTG 4  Patient will be able to weed eat yard for 30' without exacerbation of symptoms.   -     LTG 5  Patient will be able to perform 5x sit to stand without UE support in 10\".   -        Time Calculation    PT Goal Re-Cert Due Date  08/07/20  -       User Key  (r) = Recorded By, (t) = Taken By, (c) = Cosigned By    Initials Name Provider Type     Ilene Quintana Physical Therapist          PT Assessment/Plan     Row Name 07/17/20 0800          PT Assessment    Functional Limitations  Decreased safety during functional activities;Impaired locomotion;Limitation in home management;Limitations in community activities;Limitations in functional capacity and performance;Performance in leisure activities;Performance in self-care ADL  -     Impairments  Impaired muscle length;Muscle strength;Pain;Range of motion;Poor body mechanics  -     Assessment Comments  54 yom presents with recurrent central low back pain that radiates into left buttock. He exhibits reduced trunk ROM and core weakness as well as reduced function. Treatment will focus on these impairments.   -     Rehab Potential  Good  -     Patient/caregiver participated in establishment of treatment plan and goals  Yes  -SW     Patient would benefit from skilled therapy intervention  Yes  -SW        PT Plan    PT Frequency  2x/week  -     Predicted Duration of Therapy Intervention (Therapy Eval)  6 weeks  -     Planned CPT's?  PT EVAL " LOW COMPLEXITY: 35372;PT RE-EVAL: 53017;PT THER PROC EA 15 MIN: 69662;PT THER ACT EA 15 MIN: 38791;PT MANUAL THERAPY EA 15 MIN: 15721;PT NEUROMUSC RE-EDUCATION EA 15 MIN: 73309;PT SELF CARE/HOME MGMT/TRAIN EA 15: 72978;PT HOT OR COLD PACK TREAT [facility]  -     Physical Therapy Interventions (Optional Details)  home exercise program;manual therapy techniques;neuromuscular re-education;patient/family education;ROM (Range of Motion);strengthening;stretching  -SW     PT Plan Comments  Focus on restoring pain free trunk ROM and gentle core strengthening initially.   -SW       User Key  (r) = Recorded By, (t) = Taken By, (c) = Cosigned By    Initials Name Provider Type    Ilene Cerda Physical Therapist            OP Exercises     Row Name 07/17/20 0800             Subjective Comments    Subjective Comments  Patient reports gradual onset central low back pain that extends into left buttock. He denies LE pain or N/T. He has worked in [employer] most of his life and feels this has contributed to pain. Patient is not currently working due to back pain, stroke in March 2020, and the pandemic. He has very mild residual weakness on left side. He takes care of yard. Using weed eater is difficult. Heavy lifting also aggravates symptoms.   -SW         Subjective Pain    Able to rate subjective pain?  yes  -SW      Pre-Treatment Pain Level  5  -SW         Exercise 1    Exercise Name 1  lower trunk rotation  -SW      Reps 1  20  -SW         Exercise 2    Exercise Name 2  pelvic rock  -SW      Reps 2  20  -SW         Exercise 3    Exercise Name 3  supine marching  -SW      Reps 3  20  -SW         Exercise 4    Exercise Name 4  sciatic nerve glide  -SW      Reps 4  30  -SW        User Key  (r) = Recorded By, (t) = Taken By, (c) = Cosigned By    Initials Name Provider Type    Ilene Cerda Physical Therapist                        Outcome Measure Options: Modifed Antonio  Modified Oswestry  Modified Oswestry Score/Comments:  44%      Time Calculation:     Start Time: 0800  Stop Time: 0833  Time Calculation (min): 33 min     Therapy Charges for Today     Code Description Service Date Service Provider Modifiers Qty    34847674038 HC PT EVAL LOW COMPLEXITY 1 7/17/2020 Ilene Quintana GP 1    72250313423 HC PT THER PROC EA 15 MIN 7/17/2020 Ilene Quintana GP 2          PT G-Codes  Outcome Measure Options: Modifed [place]  Modified Oswestry Score/Comments: 44%         Ilene Quintana  7/17/2020

## 2020-07-20 ENCOUNTER — HOSPITAL ENCOUNTER (OUTPATIENT)
Dept: PHYSICAL THERAPY | Facility: HOSPITAL | Age: 55
Setting detail: THERAPIES SERIES
Discharge: HOME | End: 2020-07-20

## 2020-07-20 DIAGNOSIS — M54.50 ACUTE LEFT-SIDED LOW BACK PAIN WITHOUT SCIATICA: Primary | ICD-10-CM

## 2020-07-20 PROCEDURE — 97110 THERAPEUTIC EXERCISES: CPT

## 2020-07-20 NOTE — THERAPY TREATMENT NOTE
Outpatient Physical Therapy Ortho Treatment Note  [facility]     Patient Name: Primo Mesa  : 1965  MRN: [id number]  Today's Date: 2020      Visit Date: 2020   Attendance:   Subjective improvement: 68%  Recert: 20  MD Appointment: 2020      Visit Dx:    ICD-10-CM ICD-9-CM   1. Acute left-sided low back pain without sciatica M54.5 724.2       Patient Active Problem List   Diagnosis   • Acute bronchitis   • Cerebrovascular accident (CVA) (CMS/HCC)        Past Medical History:   Diagnosis Date   • Chest pain    • Dehydration    • Dizziness    • Hypertension         Past Surgical History:   Procedure Laterality Date   • APPENDECTOMY     • LEG SURGERY         PT Ortho     Row Name 20 09       Posture/Observations    Posture/Observations Comments  Increased pain noted with extension biased therex.   -EM      User Key  (r) = Recorded By, (t) = Taken By, (c) = Cosigned By    Initials Name Provider Type    EM Raj Thomas, PTA Physical Therapy Assistant                      PT Assessment/Plan     Row Name 20 09          PT Assessment    Functional Limitations  Decreased safety during functional activities;Impaired locomotion;Limitation in home management;Limitations in community activities;Limitations in functional capacity and performance;Performance in leisure activities;Performance in self-care ADL  -EM     Impairments  Impaired muscle length;Muscle strength;Pain;Range of motion;Poor body mechanics  -EM     Assessment Comments  Pt taryn tx well with addition of new stretches and therex. Pt seems to tolerate flexion biased therex better than extension at this time. No goals met this tx.  Pt had a slight increase in pain post tx.  -EM     Rehab Potential  Good  -EM     Patient/caregiver participated in establishment of treatment plan and goals  Yes  -EM     Patient would benefit from skilled therapy intervention  Yes  -EM        PT Plan    PT Frequency  2x/week   "-EM     Predicted Duration of Therapy Intervention (Therapy Eval)  6 wks  -EM     PT Plan Comments  Cont flexion biased therex, stretching, and core/hip strengthening  -EM       User Key  (r) = Recorded By, (t) = Taken By, (c) = Cosigned By    Initials Name Provider Type    Raj Arriola PTA Physical Therapy Assistant          Modalities     Row Name 07/20/20 0900             Moist Heat    MH Applied  Yes  -EM      Location  T-L Spine  -EM      Rx Minutes  10 mins  -EM      MH S/P Rx  Yes  -EM        User Key  (r) = Recorded By, (t) = Taken By, (c) = Cosigned By    Initials Name Provider Type    Raj Arriola PTA Physical Therapy Assistant        OP Exercises     Row Name 07/20/20 0900             Subjective Comments    Subjective Comments  Pt reports that his pain is worse around bed time. Pt states pain is localized in his L-Spine but occassionally down posterior/lateral  L LE to knee \"But that only happens when I do something I'm not supposed to like using my chain saw.\" Pt also reports  difficulty with standing and walking for prolonged times on concrete stating, \"It kills me.\"   -EM         Subjective Pain    Able to rate subjective pain?  yes  -EM      Pre-Treatment Pain Level  5  -EM      Post-Treatment Pain Level  6  -EM         Exercise 1    Exercise Name 1  B St Ham S  -EM      Sets 1  3  -EM      Time 1  30\"  -EM         Exercise 2    Exercise Name 2  B Seated Piriformis S  -EM      Sets 2  3  -EM      Time 2  30\"  -EM         Exercise 3    Exercise Name 3  DKTC  -EM      Sets 3  3  -EM      Time 3  30\"  -EM      Additional Comments  attempted DANA but increased pain noted.  -EM         Exercise 4    Exercise Name 4  HLTR  -EM      Sets 4  1  -EM      Reps 4  30  -EM         Exercise 5    Exercise Name 5  Bridges  -EM      Sets 5  1  -EM      Reps 5  20  -EM         Exercise 6    Exercise Name 6  B SL Clamshells  -EM      Sets 6  1  -EM      Reps 6  20  -EM         Exercise 7    Exercise Name 7  " "MHP T-L spine  -EM      Time 7  10'  -EM        User Key  (r) = Recorded By, (t) = Taken By, (c) = Cosigned By    Initials Name Provider Type    Raj Arriola PTA Physical Therapy Assistant                       PT OP Goals     Row Name 07/20/20 1000          PT Short Term Goals    STG Date to Achieve  08/07/20  -EM     STG 1  Patient will be independent with HEP.   -EM     STG 1 Progress  Not Met  -EM     STG 2  Perform 5x sit to stand.   -EM     STG 2 Progress  Not Met  -EM        Long Term Goals    LTG Date to Achieve  08/28/20  -EM     LTG 1  Patient will score 22% on modified Oswestry.   -EM     LTG 1 Progress  Not Met  -EM     LTG 2  Patient will exhibit 100% active flexion and sidebending and 50% extension at trunk.   -EM     LTG 2 Progress  Not Met  -EM     LTG 3  Patient will exhibit 5/5 bilateral hip and IR strength.   -EM     LTG 3 Progress  Not Met  -EM     LTG 4  Patient will be able to weed eat yard for 30' without exacerbation of symptoms.   -EM     LTG 4 Progress  Not Met  -EM     LTG 5  Patient will be able to perform 5x sit to stand without UE support in 10\".   -EM     LTG 5 Progress  Not Met  -EM        Time Calculation    PT Goal Re-Cert Due Date  08/07/20  -EM       User Key  (r) = Recorded By, (t) = Taken By, (c) = Cosigned By    Initials Name Provider Type    EM Raj Thomas PTA Physical Therapy Assistant                         Time Calculation:   Start Time: 0928  Stop Time: 1014  Time Calculation (min): 46 min  PT Non-Billable Time (min): 10 min  Total Timed Code Minutes- PT: 36 minute(s)  Therapy Charges for Today     Code Description Service Date Service Provider Modifiers Qty    66838631028 HC PT THER PROC EA 15 MIN 7/20/2020 Raj Thomas PTA GP 2    68190036611 HC PT THER SUPP EA 15 MIN 7/20/2020 Raj Thomas PTA GP 1                    Raj Thomas PTA  7/20/2020     "

## 2020-07-23 ENCOUNTER — APPOINTMENT (OUTPATIENT)
Dept: PHYSICAL THERAPY | Facility: HOSPITAL | Age: 55
End: 2020-07-23

## 2020-07-27 ENCOUNTER — HOSPITAL ENCOUNTER (OUTPATIENT)
Dept: PHYSICAL THERAPY | Facility: HOSPITAL | Age: 55
Setting detail: THERAPIES SERIES
Discharge: HOME | End: 2020-07-27

## 2020-07-27 DIAGNOSIS — M54.50 ACUTE LEFT-SIDED LOW BACK PAIN WITHOUT SCIATICA: Primary | ICD-10-CM

## 2020-07-27 PROCEDURE — 97110 THERAPEUTIC EXERCISES: CPT

## 2020-07-27 NOTE — THERAPY TREATMENT NOTE
Outpatient Physical Therapy Ortho Treatment Note  [facility]     Patient Name: Primo Mesa  : 1965  MRN: [id number]  Today's Date: 2020      Visit Date: 2020   Attendance: 3/3 of 20  Subjective improvement:68%  Recert:20  MD Appointment: 2020      Visit Dx:    ICD-10-CM ICD-9-CM   1. Acute left-sided low back pain without sciatica M54.5 724.2       Patient Active Problem List   Diagnosis   • Acute bronchitis   • Cerebrovascular accident (CVA) (CMS/HCC)        Past Medical History:   Diagnosis Date   • Chest pain    • Dehydration    • Dizziness    • Hypertension         Past Surgical History:   Procedure Laterality Date   • APPENDECTOMY     • LEG SURGERY                         PT Assessment/Plan     Row Name 20 0900          PT Assessment    Functional Limitations  Decreased safety during functional activities;Impaired locomotion;Limitation in home management;Limitations in community activities;Limitations in functional capacity and performance;Performance in leisure activities;Performance in self-care ADL  -EM     Impairments  Impaired muscle length;Muscle strength;Pain;Range of motion;Poor body mechanics  -EM     Assessment Comments  Pt unable to tolerate quadruped exercises when attempted due to pain and L carpal tunnel.  Pt taryn tx well with initiation of resistance with clamshells.  1 goal met this tx with the ability to perform sit-stands. Pt had increased  pain post tx.   -EM     Rehab Potential  Good  -EM     Patient/caregiver participated in establishment of treatment plan and goals  Yes  -EM     Patient would benefit from skilled therapy intervention  Yes  -EM        PT Plan    PT Frequency  2x/week  -EM     Predicted Duration of Therapy Intervention (Therapy Eval)  6 wks  -EM     PT Plan Comments  Cont current POC with progression as taryn with flexion biased therex.   -EM       User Key  (r) = Recorded By, (t) = Taken By, (c) = Cosigned By    Initials Name  "Provider Type    EM Raj Thomas PTA Physical Therapy Assistant          Modalities     Row Name 07/27/20 0900             Ice    Patient denies application of Ice  Yes  -EM      Patient reports will apply ice at home to involved area  Yes  -EM        User Key  (r) = Recorded By, (t) = Taken By, (c) = Cosigned By    Initials Name Provider Type    EM Raj Thomas PTA Physical Therapy Assistant        OP Exercises     Row Name 07/27/20 0900             Subjective Comments    Subjective Comments  Pt reports last tx helped a little. \"I could tolerate standing on concrete better. I was even able to go to a wedding\"  Pt reports increased pain yesterday.   -EM         Subjective Pain    Able to rate subjective pain?  yes  -EM      Pre-Treatment Pain Level  6  -EM      Post-Treatment Pain Level  8  -EM         Exercise 1    Exercise Name 1  B St Ham S  -EM      Sets 1  3  -EM      Time 1  30\"  -EM         Exercise 2    Exercise Name 2  B Seated Piriformis S  -EM      Sets 2  3  -EM      Time 2  30\"  -EM         Exercise 3    Exercise Name 3  DKTC  -EM      Sets 3  3  -EM      Time 3  30\"  -EM         Exercise 4    Exercise Name 4  HLTR  -EM      Sets 4  1  -EM      Reps 4  20  -EM      Time 4  .  -EM         Exercise 5    Exercise Name 5  Bridges  -EM      Sets 5  2  -EM      Reps 5  10  -EM         Exercise 6    Exercise Name 6  B SL Clamshells w/ TB  -EM      Sets 6  1  -EM      Reps 6  20  -EM      Additional Comments  Green  -EM         Exercise 7    Exercise Name 7  Crunches: Grade I  -EM      Sets 7  2  -EM      Reps 7  10  -EM         Exercise 8    Exercise Name 8  Sit-Stands  -EM      Sets 8  1  -EM      Reps 8  15  -EM         Exercise 9    Exercise Name 9  Ice at home  -EM        User Key  (r) = Recorded By, (t) = Taken By, (c) = Cosigned By    Initials Name Provider Type    EM Raj Thomas PTA Physical Therapy Assistant                       PT OP Goals     Row Name 07/27/20 0900          PT Short Term " "Goals    STG Date to Achieve  08/07/20  -EM     STG 1  Patient will be independent with HEP.   -EM     STG 1 Progress  Not Met  -EM     STG 2  Perform 5x sit to stand.   -EM     STG 2 Progress  (S) Met  -EM        Long Term Goals    LTG Date to Achieve  08/28/20  -EM     LTG 1  Patient will score 22% on modified Oswestry.   -EM     LTG 1 Progress  Not Met  -EM     LTG 2  Patient will exhibit 100% active flexion and sidebending and 50% extension at trunk.   -EM     LTG 2 Progress  Not Met  -EM     LTG 3  Patient will exhibit 5/5 bilateral hip and IR strength.   -EM     LTG 3 Progress  Not Met  -EM     LTG 4  Patient will be able to weed eat yard for 30' without exacerbation of symptoms.   -EM     LTG 4 Progress  Not Met  -EM     LTG 5  Patient will be able to perform 5x sit to stand without UE support in 10\".   -EM     LTG 5 Progress  Not Met  -EM        Time Calculation    PT Goal Re-Cert Due Date  08/07/20  -EM       User Key  (r) = Recorded By, (t) = Taken By, (c) = Cosigned By    Initials Name Provider Type    EM Raj Thomas PTA Physical Therapy Assistant                         Time Calculation:   Start Time: 0930  Stop Time: 1013  Time Calculation (min): 43 min  Total Timed Code Minutes- PT: 43 minute(s)  Therapy Charges for Today     Code Description Service Date Service Provider Modifiers Qty    40416982348  PT THER PROC EA 15 MIN 7/27/2020 Raj Thomas PTA GP 3                    Raj Thomas PTA  7/27/2020     "

## 2020-07-29 ENCOUNTER — APPOINTMENT (OUTPATIENT)
Dept: PHYSICAL THERAPY | Facility: HOSPITAL | Age: 55
End: 2020-07-29

## 2020-08-03 ENCOUNTER — APPOINTMENT (OUTPATIENT)
Dept: PHYSICAL THERAPY | Facility: HOSPITAL | Age: 55
End: 2020-08-03

## 2020-08-05 ENCOUNTER — HOSPITAL ENCOUNTER (OUTPATIENT)
Dept: PHYSICAL THERAPY | Facility: HOSPITAL | Age: 55
Setting detail: THERAPIES SERIES
Discharge: HOME | End: 2020-08-05

## 2020-08-05 DIAGNOSIS — M54.50 ACUTE LEFT-SIDED LOW BACK PAIN WITHOUT SCIATICA: Primary | ICD-10-CM

## 2020-08-05 PROCEDURE — 97110 THERAPEUTIC EXERCISES: CPT | Performed by: PHYSICAL THERAPIST

## 2020-08-05 NOTE — THERAPY PROGRESS REPORT/RE-CERT
Outpatient Physical Therapy Ortho Progress Note  [facility]     Patient Name: Primo Mesa  : 1965  MRN: [id number]  Today's Date: 2020      Visit Date: 2020  Attendance:   Subjective improvement:68%  Recert: 20  MD Appointment: 2020  Visit Dx:    ICD-10-CM ICD-9-CM   1. Acute left-sided low back pain without sciatica M54.5 724.2       Patient Active Problem List   Diagnosis   • Acute bronchitis   • Cerebrovascular accident (CVA) (CMS/HCC)        Past Medical History:   Diagnosis Date   • Chest pain    • Dehydration    • Dizziness    • Hypertension         Past Surgical History:   Procedure Laterality Date   • APPENDECTOMY     • LEG SURGERY         PT Ortho     Row Name 20 0900       Subjective Comments    Subjective Comments  Patient reports compliance with HEP, but he feels the exercises sometimes make his symptoms worse. He reports persistent low back pain extending into left buttock. Getting in and out of car aggravates symptoms.   -SW       Subjective Pain    Able to rate subjective pain?  yes  -SW    Pre-Treatment Pain Level  7  -SW    Post-Treatment Pain Level  --  -SW       Posture/Observations    Posture/Observations Comments  no antalgia  -SW       Special Tests/Palpation    Special Tests/Palpation  -- -SLR B  -SW       Head/Neck/Trunk    Trunk Extension AROM  10% painful  -SW    Trunk Flexion AROM  90% painful  -SW    Trunk Lt Lateral Flexion AROM  75% painful  -SW    Trunk Rt Lateral Flexion AROM  75%  -SW       MMT Right Lower Ext    Rt Hip Flexion MMT, Gross Movement  (5/5) normal  -SW    Rt Hip Extension MMT, Gross Movement  (3/5) fair  -SW    Rt Hip ABduction MMT, Gross Movement  (5/5) normal  -SW    Rt Hip Internal (Medial) Rotation MMT, Gross Movement  (4/5) good  -SW    Rt Hip External (Lateral) Rotation MMT, Gross Movement  (5/5) normal  -SW    Rt Knee Extension MMT, Gross Movement  (5/5) normal  -SW    Rt Knee Flexion MMT, Gross Movement  (5/5)  normal  -SW    Rt Ankle Plantarflexion MMT, Gross Movement  (5/5) normal  -SW    Rt Ankle Dorsiflexion MMT, Gross Movement  (5/5) normal  -SW    Rt Ankle Subtalar Eversion MMT, Gross Movement  (5/5) normal  -SW    Rt MTP Extension MMT, Gross Movement  (5/5) normal  -SW       MMT Left Lower Ext    Lt Hip Flexion MMT, Gross Movement  (5/5) normal  -SW    Lt Hip Extension MMT, Gross Movement  (3/5) fair  -SW    Lt Hip ABduction MMT, Gross Movement  (5/5) normal  -SW    Lt Hip Internal (Medial) Rotation MMT, Gross Movement  (4/5) good  -SW    Lt Hip External (Lateral) Rotation MMT, Gross Movement  (5/5) normal  -SW    Lt Knee Extension MMT, Gross Movement  (4+/5) good plus  -SW    Lt Knee Flexion MMT, Gross Movement  (5/5) normal  -SW    Lt Ankle Plantarflexion MMT, Gross Movement  (5/5) normal  -SW    Lt Ankle Dorsiflexion MMT, Gross Movement  (5/5) normal  -SW    Lt Ankle Subtalar Eversion MMT, Gross Movement  (5/5) normal  -SW    Lt MTP Extension MMT, Gross Movement  (5/5) normal  -SW      User Key  (r) = Recorded By, (t) = Taken By, (c) = Cosigned By    Initials Name Provider Type    Ilene Cerda Physical Therapist                      PT Assessment/Plan     Row Name 08/05/20 0900          PT Assessment    Functional Limitations  Decreased safety during functional activities;Impaired locomotion;Limitation in home management;Limitations in community activities;Limitations in functional capacity and performance;Performance in leisure activities;Performance in self-care ADL  -     Impairments  Impaired muscle length;Muscle strength;Pain;Range of motion;Poor body mechanics  -     Assessment Comments  Patient exhibits no change in trunk RO<, core and LE strength, or pain.   -     Rehab Potential  Good  -SW     Patient/caregiver participated in establishment of treatment plan and goals  Yes  -     Patient would benefit from skilled therapy intervention  Yes  -        PT Plan    PT Frequency  2x/week  -      Predicted Duration of Therapy Intervention (Therapy Eval)  6 weeks  -SW     Planned CPT's?  PT RE-EVAL: 51515;PT THER PROC EA 15 MIN: 90292;PT THER ACT EA 15 MIN: 30532;PT MANUAL THERAPY EA 15 MIN: 86704;PT NEUROMUSC RE-EDUCATION EA 15 MIN: 48392;PT SELF CARE/HOME MGMT/TRAIN EA 15: 14147;PT HOT OR COLD PACK TREAT MCARE  -SW     Physical Therapy Interventions (Optional Details)  balance training;home exercise program;manual therapy techniques;neuromuscular re-education;ROM (Range of Motion);stair training;strengthening;stretching;patient/family education  -SW     PT Plan Comments  Continue per POC with focus on restoring trunk ROM and improving core strength. If no change in symptoms in 2 weeks, then dc PT.    -SW       User Key  (r) = Recorded By, (t) = Taken By, (c) = Cosigned By    Initials Name Provider Type    Ilene Cerda Physical Therapist            OP Exercises     Row Name 08/05/20 0900             Subjective Comments    Subjective Comments  Patient reports compliance with HEP, but he feels the exercises sometimes make his symptoms worse. He reports persistent low back pain extending into left buttock. Getting in and out of car aggravates symptoms.   -SW         Subjective Pain    Able to rate subjective pain?  yes  -SW      Pre-Treatment Pain Level  7  -SW      Post-Treatment Pain Level  --  -SW         Exercise 1    Exercise Name 1  Pro II L1  -SW      Time 1  10  -SW         Exercise 2    Exercise Name 2  lower trunk rotation   -SW      Reps 2  20  -SW         Exercise 3    Exercise Name 3  pelvic rock  -SW      Reps 3  20  -SW         Exercise 4    Exercise Name 4  supine marching  -SW      Reps 4  20  -SW         Exercise 5    Exercise Name 5  bridging  -SW      Reps 5  20  -SW        User Key  (r) = Recorded By, (t) = Taken By, (c) = Cosigned By    Initials Name Provider Type    Ilene Cerda Physical Therapist                       PT OP Goals     Row Name 08/05/20 0900          PT Short Term  "Goals    STG Date to Achieve  08/07/20  -     STG 1  Patient will be independent with HEP.   -     STG 1 Progress  Not Met  -     STG 2  Perform 5x sit to stand.   -     STG 2 Progress  Met  -        Long Term Goals    LTG Date to Achieve  08/28/20  -     LTG 1  Patient will score 22% on modified Oswestry.   -     LTG 1 Progress  Not Met  -     LTG 2  Patient will exhibit 100% active flexion and sidebending and 50% extension at trunk.   -     LTG 2 Progress  Not Met  -     LTG 3  Patient will exhibit 5/5 bilateral hip and IR strength.   -     LTG 3 Progress  Not Met  -     LTG 4  Patient will be able to weed eat yard for 30' without exacerbation of symptoms.   -     LTG 4 Progress  Not Met  -     LTG 5  Patient will be able to perform 5x sit to stand without UE support in 10\".   -     LTG 5 Progress  Not Met  -        Time Calculation    PT Goal Re-Cert Due Date  08/26/20  -       User Key  (r) = Recorded By, (t) = Taken By, (c) = Cosigned By    Initials Name Provider Type    Ilene Cerda Physical Therapist               Outcome Measure Options: 5x Sit to Stand  5 Times Sit to Stand  5 Times Sit to Stand (seconds): 19.03 seconds  5 Times Sit to Stand Comments: no UE support  Modified Oswestry  Modified Oswestry Score/Comments: 42%      Time Calculation:   Start Time: 0931  Stop Time: 1013  Time Calculation (min): 42 min  Therapy Charges for Today     Code Description Service Date Service Provider Modifiers Qty    63496420675 HC PT THER PROC EA 15 MIN 8/5/2020 Ilene Quintana GP 3          PT G-Codes  Outcome Measure Options: 5x Sit to Stand  Modified Oswestry Score/Comments: 42%         Ilene Quintana  8/5/2020     "

## 2020-08-10 ENCOUNTER — HOSPITAL ENCOUNTER (OUTPATIENT)
Dept: PHYSICAL THERAPY | Facility: HOSPITAL | Age: 55
Setting detail: THERAPIES SERIES
Discharge: HOME | End: 2020-08-10

## 2020-08-10 DIAGNOSIS — M54.50 ACUTE LEFT-SIDED LOW BACK PAIN WITHOUT SCIATICA: Primary | ICD-10-CM

## 2020-08-10 PROCEDURE — 97110 THERAPEUTIC EXERCISES: CPT

## 2020-08-10 PROCEDURE — G0283 ELEC STIM OTHER THAN WOUND: HCPCS

## 2020-08-10 NOTE — THERAPY TREATMENT NOTE
Outpatient Physical Therapy Ortho Treatment Note  [facility]     Patient Name: Primo Mesa  : 1965  MRN: [id number]  Today's Date: 8/10/2020      Visit Date: 08/10/2020   Attendance: 5/8 of 8 (20 total)  Subjective improvement: 68%  Recert:20  MD Appointment: 20      Visit Dx:    ICD-10-CM ICD-9-CM   1. Acute left-sided low back pain without sciatica M54.5 724.2       Patient Active Problem List   Diagnosis   • Acute bronchitis   • Cerebrovascular accident (CVA) (CMS/Formerly [facility])        Past Medical History:   Diagnosis Date   • Chest pain    • Dehydration    • Dizziness    • Hypertension         Past Surgical History:   Procedure Laterality Date   • APPENDECTOMY     • LEG SURGERY         PT Ortho     Row Name 08/10/20 0800       Subjective Pain    Post-Treatment Pain Level  7  -EM       Posture/Observations    Posture/Observations Comments  Amb with guarded posture.  L mild antalgic gt from old sx.  Slight L lateral lean and FF posture.   -EM      User Key  (r) = Recorded By, (t) = Taken By, (c) = Cosigned By    Initials Name Provider Type    EM Raj Thomas, PTA Physical Therapy Assistant                      PT Assessment/Plan     Row Name 08/10/20 08          PT Assessment    Functional Limitations  Decreased safety during functional activities;Impaired locomotion;Limitation in home management;Limitations in community activities;Limitations in functional capacity and performance;Performance in leisure activities;Performance in self-care ADL  -EM     Impairments  Impaired muscle length;Muscle strength;Pain;Range of motion;Poor body mechanics  -EM     Assessment Comments  IFC added with permission from PT due to pt having high levels of pain. Pt taryn tx well, but is progressing slowly interms of pain relief. Pt had slight decrease in pain post tx.   -EM     Rehab Potential  Good  -EM     Patient/caregiver participated in establishment of treatment plan and goals  Yes  -EM     Patient  "would benefit from skilled therapy intervention  Yes  -EM        PT Plan    PT Frequency  2x/week  -EM     Predicted Duration of Therapy Intervention (Therapy Eval)  6 wks Plan D/C in 2 wks if no improvement  -EM     PT Plan Comments  Cont current POC, progress as able  -EM       User Key  (r) = Recorded By, (t) = Taken By, (c) = Cosigned By    Initials Name Provider Type    EM Raj Thomas, PTA Physical Therapy Assistant          Modalities     Row Name 08/10/20 0800             Ice    Ice Applied  Yes w/ IFC  -EM      Location  L-Spine  -EM      Rx Minutes  15 mins  -EM      Ice S/P Rx  Yes  -EM         ELECTRICAL STIMULATION    Attended/Unattended  Unattended  -EM      Stimulation Type  IFC w/ Ice  -EM      Max mAmp  11  -EM      Location/Electrode Placement/Other  L-Spine 15'  -EM        User Key  (r) = Recorded By, (t) = Taken By, (c) = Cosigned By    Initials Name Provider Type    EM Raj Thomas, PTA Physical Therapy Assistant        OP Exercises     Row Name 08/10/20 0800             Subjective Comments    Subjective Comments  Pt states he could hardly walk over the weekend due to pain. Pt states he was wanting to go fishing, but was unable due to his back pain. Pt states \"nothing helps it. I use Ice, I use heat and nothing helps.\"  -EM         Subjective Pain    Able to rate subjective pain?  yes  -EM      Pre-Treatment Pain Level  8  -EM      Post-Treatment Pain Level  7  -EM         Exercise 1    Exercise Name 1  B St Ham S  -EM      Sets 1  3  -EM      Time 1  30\"  -EM         Exercise 2    Exercise Name 2  B Seated Piriformis S  -EM      Sets 2  3  -EM      Time 2  30\"  -EM         Exercise 3    Exercise Name 3  HLTR  -EM      Sets 3  1  -EM      Reps 3  30  -EM         Exercise 4    Exercise Name 4  Dead Bug  -EM      Sets 4  1  -EM      Reps 4  30  -EM         Exercise 5    Exercise Name 5  Bridges  -EM      Sets 5  1  -EM      Reps 5  30  -EM         Exercise 6    Exercise Name 6  B SL Clamshells " "w/ TB  -EM      Sets 6  1  -EM      Reps 6  30  -EM      Additional Comments  Green  -EM         Exercise 7    Exercise Name 7  Crunches: Grade 1  -EM      Sets 7  1  -EM      Reps 7  30  -EM         Exercise 8    Exercise Name 8  IFC w/ ice  -EM      Time 8  15'  -EM        User Key  (r) = Recorded By, (t) = Taken By, (c) = Cosigned By    Initials Name Provider Type    Raj Arriola PTA Physical Therapy Assistant                       PT OP Goals     Row Name 08/10/20 0800          PT Short Term Goals    STG Date to Achieve  08/07/20  -EM     STG 1  Patient will be independent with HEP.   -EM     STG 1 Progress  Not Met  -EM     STG 2  Perform 5x sit to stand.   -EM     STG 2 Progress  (S) Met  -EM        Long Term Goals    LTG Date to Achieve  08/28/20  -EM     LTG 1  Patient will score 22% on modified Oswestry.   -EM     LTG 1 Progress  Not Met  -EM     LTG 2  Patient will exhibit 100% active flexion and sidebending and 50% extension at trunk.   -EM     LTG 2 Progress  Not Met  -EM     LTG 3  Patient will exhibit 5/5 bilateral hip and IR strength.   -EM     LTG 3 Progress  Not Met  -EM     LTG 4  Patient will be able to weed eat yard for 30' without exacerbation of symptoms.   -EM     LTG 4 Progress  Not Met  -EM     LTG 5  Patient will be able to perform 5x sit to stand without UE support in 10\".   -EM     LTG 5 Progress  Not Met  -EM        Time Calculation    PT Goal Re-Cert Due Date  08/26/20  -EM       User Key  (r) = Recorded By, (t) = Taken By, (c) = Cosigned By    Initials Name Provider Type    Raj Arriola PTA Physical Therapy Assistant                         Time Calculation:   Start Time: 0759  Stop Time: 0900  Time Calculation (min): 61 min  Total Timed Code Minutes- PT: 61 minute(s)  Therapy Charges for Today     Code Description Service Date Service Provider Modifiers Qty    99976433866 HC PT THER PROC EA 15 MIN 8/10/2020 Raj Thomas PTA GP 3    35259476923 HC PT ELECTRICAL STIM " UNATTENDED 8/10/2020 Raj Thomas, PTA  1                    Raj Thomas, PTA  8/10/2020

## 2020-08-12 ENCOUNTER — HOSPITAL ENCOUNTER (OUTPATIENT)
Dept: PHYSICAL THERAPY | Facility: HOSPITAL | Age: 55
Setting detail: THERAPIES SERIES
Discharge: HOME | End: 2020-08-12

## 2020-08-12 DIAGNOSIS — M54.50 ACUTE LEFT-SIDED LOW BACK PAIN WITHOUT SCIATICA: Primary | ICD-10-CM

## 2020-08-12 PROCEDURE — G0283 ELEC STIM OTHER THAN WOUND: HCPCS

## 2020-08-12 PROCEDURE — 97110 THERAPEUTIC EXERCISES: CPT

## 2020-08-12 NOTE — THERAPY TREATMENT NOTE
Outpatient Physical Therapy Ortho Treatment Note  [facility]     Patient Name: Primo Mesa  : 1965  MRN: [id number]  Today's Date: 2020      Visit Date: 2020   Attendance:  of 8  Subjective improvement:68%  Recert: 20  MD Appointment: 20      Visit Dx:    ICD-10-CM ICD-9-CM   1. Acute left-sided low back pain without sciatica M54.5 724.2       Patient Active Problem List   Diagnosis   • Acute bronchitis   • Cerebrovascular accident (CVA) (CMS/HCC)        Past Medical History:   Diagnosis Date   • Chest pain    • Dehydration    • Dizziness    • Hypertension         Past Surgical History:   Procedure Laterality Date   • APPENDECTOMY     • LEG SURGERY         PT Ortho     Row Name 20 0800       Posture/Observations    Posture/Observations Comments  Amb with guarded posture.  L mild antalgic gt from old sx.  Slight L lateral lean and FF posture.   -EM      User Key  (r) = Recorded By, (t) = Taken By, (c) = Cosigned By    Initials Name Provider Type    EM Raj Thomas, PTA Physical Therapy Assistant                      PT Assessment/Plan     Row Name 20 0800          PT Assessment    Functional Limitations  Decreased safety during functional activities;Impaired locomotion;Limitation in home management;Limitations in community activities;Limitations in functional capacity and performance;Performance in leisure activities;Performance in self-care ADL  -EM     Impairments  Impaired muscle length;Muscle strength;Pain;Range of motion;Poor body mechanics  -EM     Assessment Comments  Pt taryn tx well with reduced pain this date. Pt cont to progress slowly with PT at this time. Pt reports compliance with HEP.   -EM     Rehab Potential  Good  -EM     Patient/caregiver participated in establishment of treatment plan and goals  Yes  -EM     Patient would benefit from skilled therapy intervention  Yes  -EM        PT Plan    PT Frequency  2x/week  -EM     Predicted Duration  "of Therapy Intervention (Therapy Eval)  6 wks  -EM     PT Plan Comments  Attempt sit-stand test  -EM       User Key  (r) = Recorded By, (t) = Taken By, (c) = Cosigned By    Initials Name Provider Type    EM Raj Thomas, CHARY Physical Therapy Assistant          Modalities     Row Name 08/12/20 0800             Ice    Ice Applied  Yes  -EM      Location  L-Spine  -EM      Rx Minutes  10 mins  -EM      Ice S/P Rx  Yes  -EM         ELECTRICAL STIMULATION    Attended/Unattended  Unattended  -EM      Stimulation Type  IFC w/ ice  -EM      Max mAmp  12  -EM      Location/Electrode Placement/Other  L-Spine 10'  -EM        User Key  (r) = Recorded By, (t) = Taken By, (c) = Cosigned By    Initials Name Provider Type    Raj Arriola PTA Physical Therapy Assistant        OP Exercises     Row Name 08/12/20 0800             Subjective Comments    Subjective Comments  Pt states he had a bad day yesterday which caused him to rest all day due to pain . Pt does report doing his HEP yesterday.  Pt states sometimes the HEP helps his pain and sometimes it causes an increase in pain. Pt states it mostly depends on how much pain he is in prior to doing them. Pt states today is a better day.  Pt reports the IFC helped him for a while with pain. \"Everytime it rains, it hurts.\"  -EM         Subjective Pain    Able to rate subjective pain?  yes  -EM      Pre-Treatment Pain Level  4  -EM      Post-Treatment Pain Level  4  -EM         Exercise 1    Exercise Name 1  PRO II-Seat 7-4.0  -EM      Time 1  10'  -EM         Exercise 2    Exercise Name 2  B Seated Piriformis S  -EM      Sets 2  3  -EM      Time 2  30\"  -EM         Exercise 3    Exercise Name 3  B ST Ham S  -EM      Sets 3  3  -EM      Time 3  30\"  -EM         Exercise 4    Exercise Name 4  Bridges  -EM      Sets 4  1  -EM      Reps 4  30  -EM         Exercise 5    Exercise Name 5  HLTR  -EM      Sets 5  1  -EM      Reps 5  30  -EM         Exercise 6    Exercise Name 6  Dead Bug " " -EM      Sets 6  1  -EM      Reps 6  30  -EM         Exercise 7    Exercise Name 7  Crunches: Grade 1  -EM      Sets 7  1  -EM      Reps 7  30  -EM         Exercise 8    Exercise Name 8  IFC w/ ice  -EM      Time 8  15'  -EM        User Key  (r) = Recorded By, (t) = Taken By, (c) = Cosigned By    Initials Name Provider Type    Raj Arriola PTA Physical Therapy Assistant                       PT OP Goals     Row Name 08/12/20 0800          PT Short Term Goals    STG Date to Achieve  08/07/20  -EM     STG 1  Patient will be independent with HEP.   -EM     STG 1 Progress  (S) Met  -EM     STG 2  Perform 5x sit to stand.   -EM     STG 2 Progress  (S) Met  -EM        Long Term Goals    LTG Date to Achieve  08/28/20  -EM     LTG 1  Patient will score 22% on modified Oswestry.   -EM     LTG 1 Progress  Not Met  -EM     LTG 2  Patient will exhibit 100% active flexion and sidebending and 50% extension at trunk.   -EM     LTG 2 Progress  Not Met  -EM     LTG 3  Patient will exhibit 5/5 bilateral hip and IR strength.   -EM     LTG 3 Progress  Not Met  -EM     LTG 4  Patient will be able to weed eat yard for 30' without exacerbation of symptoms.   -EM     LTG 4 Progress  Not Met  -EM     LTG 5  Patient will be able to perform 5x sit to stand without UE support in 10\".   -EM     LTG 5 Progress  Not Met  -EM        Time Calculation    PT Goal Re-Cert Due Date  08/26/20  -EM       User Key  (r) = Recorded By, (t) = Taken By, (c) = Cosigned By    Initials Name Provider Type    Raj Arriola PTA Physical Therapy Assistant                         Time Calculation:   Start Time: 0758  Stop Time: 0849  Time Calculation (min): 51 min  Total Timed Code Minutes- PT: 51 minute(s)  Therapy Charges for Today     Code Description Service Date Service Provider Modifiers Qty    57088647199 HC PT THER PROC EA 15 MIN 8/12/2020 Raj Thomas PTA GP 2    25936374574 HC PT ELECTRICAL STIM UNATTENDED 8/12/2020 Raj Thomas PTA  1    "                 Raj Thomas, PTA  8/12/2020

## 2020-08-17 ENCOUNTER — HOSPITAL ENCOUNTER (OUTPATIENT)
Dept: PHYSICAL THERAPY | Facility: HOSPITAL | Age: 55
Setting detail: THERAPIES SERIES
Discharge: HOME | End: 2020-08-17

## 2020-08-17 DIAGNOSIS — M54.50 ACUTE LEFT-SIDED LOW BACK PAIN WITHOUT SCIATICA: Primary | ICD-10-CM

## 2020-08-17 PROCEDURE — 97110 THERAPEUTIC EXERCISES: CPT

## 2020-08-17 PROCEDURE — G0283 ELEC STIM OTHER THAN WOUND: HCPCS

## 2020-08-17 NOTE — THERAPY TREATMENT NOTE
"    Outpatient Physical Therapy Ortho Treatment Note  [facility]     Patient Name: Primo Mesa  : 1965  MRN: [id number]  Today's Date: 2020      Visit Date: 2020   Attendance: 7/10 of 8  Subjective improvement: 60% overall performing task; 5% improvement with PT  Recert: 20  MD Appointment: 20        Visit Dx:    ICD-10-CM ICD-9-CM   1. Acute left-sided low back pain without sciatica M54.5 724.2       Patient Active Problem List   Diagnosis   • Acute bronchitis   • Cerebrovascular accident (CVA) (CMS/HCC)        Past Medical History:   Diagnosis Date   • Chest pain    • Dehydration    • Dizziness    • Hypertension         Past Surgical History:   Procedure Laterality Date   • APPENDECTOMY     • LEG SURGERY         PT Ortho     Row Name 20 0800       Subjective Comments    Subjective Comments  Pt states he was doing well until yesterday when he pushed mowed his yard. Pt states his pain increased after that. \" 'Im about the same as I was when I started PT. It still hurts about the same and the same things hurts it.\" Pt goes to pain mgmt MD on . Pt feels cryotherapy helps him more than MHP.   -EM       Subjective Pain    Post-Treatment Pain Level  8  -EM       Posture/Observations    Posture/Observations Comments  Amb with guarded posture.  L mild antalgic gt from old sx.  Slight L lateral lean and FF posture.   -EM      User Key  (r) = Recorded By, (t) = Taken By, (c) = Cosigned By    Initials Name Provider Type    EM Raj Thomas, PTA Physical Therapy Assistant                      PT Assessment/Plan     Row Name 20 0800          PT Assessment    Functional Limitations  Decreased safety during functional activities;Impaired locomotion;Limitation in home management;Limitations in community activities;Limitations in functional capacity and performance;Performance in leisure activities;Performance in self-care ADL  -EM     Impairments  Impaired muscle length;Muscle " "strength;Pain;Range of motion;Poor body mechanics  -EM     Assessment Comments  Pt taryn tx well, he is still having increased pain that is related to activity he performed throughout the day. Pt has showed minimal improvements thus far throughout tx and subjectively reports a 60% ability to perform task and a 5% improvement with improvement with PT. Tx is also limited due to L carpal tunnel thus hendering progression to quadruped therex.  No goals met this tx. Pt performes x5  Sit-stand test in 19.30\".  -EM     Rehab Potential  Good  -EM     Patient/caregiver participated in establishment of treatment plan and goals  Yes  -EM     Patient would benefit from skilled therapy intervention  Yes  -EM        PT Plan    PT Frequency  2x/week  -EM     Predicted Duration of Therapy Intervention (Therapy Eval)  6 wks  -EM     PT Plan Comments  (S) Plan D/C to Ind HEP next tx due to plateau in function/improvements  -EM       User Key  (r) = Recorded By, (t) = Taken By, (c) = Cosigned By    Initials Name Provider Type    EM Raj Thomas, CHARY Physical Therapy Assistant          Modalities     Row Name 08/17/20 0800             Ice    Ice Applied  Yes  -EM      Location  L-Spine  -EM      Rx Minutes  15 mins  -EM      Ice S/P Rx  Yes  -EM         ELECTRICAL STIMULATION    Attended/Unattended  Unattended  -EM      Stimulation Type  IFC w/ ice  -EM      Max mAmp  10  -EM      Location/Electrode Placement/Other  L-Spine 15'  -EM        User Key  (r) = Recorded By, (t) = Taken By, (c) = Cosigned By    Initials Name Provider Type    EM Raj Thomas PTA Physical Therapy Assistant        OP Exercises     Row Name 08/17/20 0800             Subjective Comments    Subjective Comments  Pt states he was doing well until yesterday when he pushed mowed his yard. Pt states his pain increased after that. \" 'Im about the same as I was when I started PT. It still hurts about the same and the same things hurts it.\" Pt goes to pain mgmt MD on " "9/1. Pt feels cryotherapy helps him more than MHP.   -EM         Subjective Pain    Able to rate subjective pain?  yes  -EM      Pre-Treatment Pain Level  8  -EM      Post-Treatment Pain Level  8  -EM         Exercise 1    Exercise Name 1  PRO II-Seat 7-4.0  -EM      Time 1  10'  -EM         Exercise 2    Exercise Name 2  B Seated Piriformis S  -EM      Sets 2  3  -EM      Time 2  30\"  -EM         Exercise 3    Exercise Name 3  B St Ham S  -EM      Sets 3  3  -EM      Time 3  30\"  -EM         Exercise 4    Exercise Name 4  Bridges  -EM      Sets 4  1  -EM      Reps 4  30  -EM         Exercise 5    Exercise Name 5  Sit-Stand Test without UE support  -EM      Sets 5  1  -EM      Reps 5  5  -EM      Additional Comments  19.30\"   -EM         Exercise 6    Exercise Name 6  Dead Bug  -EM      Sets 6  1  -EM      Reps 6  30  -EM         Exercise 7    Exercise Name 7  Crunches: Grade 1  -EM      Sets 7  1  -EM      Reps 7  30  -EM         Exercise 8    Exercise Name 8  IFC w/ ice  -EM      Time 8  15'  -EM        User Key  (r) = Recorded By, (t) = Taken By, (c) = Cosigned By    Initials Name Provider Type    EM Raj Thomas, PTA Physical Therapy Assistant                       PT OP Goals     Row Name 08/17/20 0800          PT Short Term Goals    STG Date to Achieve  08/07/20  -EM     STG 1  Patient will be independent with HEP.   -EM     STG 1 Progress  (S) Met  -EM     STG 2  Perform 5x sit to stand.   -EM     STG 2 Progress  (S) Met  -EM        Long Term Goals    LTG Date to Achieve  08/28/20  -EM     LTG 1  Patient will score 22% on modified Oswestry.   -EM     LTG 1 Progress  Not Met  -EM     LTG 2  Patient will exhibit 100% active flexion and sidebending and 50% extension at trunk.   -EM     LTG 2 Progress  Not Met  -EM     LTG 3  Patient will exhibit 5/5 bilateral hip and IR strength.   -EM     LTG 3 Progress  Not Met  -EM     LTG 4  Patient will be able to weed eat yard for 30' without exacerbation of symptoms.   " "-EM     LTG 4 Progress  Not Met  -EM     LTG 5  Patient will be able to perform 5x sit to stand without UE support in 10\".   -EM     LTG 5 Progress  Not Met  -EM     LTG 5 Progress Comments  19.30\"  -EM        Time Calculation    PT Goal Re-Cert Due Date  08/26/20  -EM       User Key  (r) = Recorded By, (t) = Taken By, (c) = Cosigned By    Initials Name Provider Type    EM Raj Thomas PTA Physical Therapy Assistant          Therapy Education  Education Details: Updated HEP Issued: Ham S, Piriformis S, HLTR, Grade I Ab curl, Dead Bug, Bridges, MHP/ice  indications  Given: HEP, Symptoms/condition management, Pain management  Program: Progressed  How Provided: Verbal, Demonstration, Written  Provided to: Patient  Level of Understanding: Verbalized, Demonstrated    Outcome Measure Options: 5x Sit to Stand  5 Times Sit to Stand  5 Times Sit to Stand (seconds): 19.3 seconds  5 Times Sit to Stand Comments: no UE support         Time Calculation:   Start Time: 0800  Stop Time: 0855  Time Calculation (min): 55 min  Total Timed Code Minutes- PT: 55 minute(s)  Therapy Charges for Today     Code Description Service Date Service Provider Modifiers Qty    57197009100 HC PT THER PROC EA 15 MIN 8/17/2020 Raj Thomas PTA GP 3    89271554072 HC PT ELECTRICAL STIM UNATTENDED 8/17/2020 Raj Thomas PTA  1          PT G-Codes  Outcome Measure Options: 5x Sit to Stand         Raj Thomas PTA  8/17/2020     "

## 2020-08-18 ENCOUNTER — HOSPITAL ENCOUNTER (OUTPATIENT)
Dept: PHYSICAL THERAPY | Facility: HOSPITAL | Age: 55
Setting detail: THERAPIES SERIES
Discharge: HOME | End: 2020-08-18

## 2020-08-18 DIAGNOSIS — M54.50 ACUTE LEFT-SIDED LOW BACK PAIN WITHOUT SCIATICA: Primary | ICD-10-CM

## 2020-08-18 PROCEDURE — G0283 ELEC STIM OTHER THAN WOUND: HCPCS

## 2020-08-18 PROCEDURE — 97110 THERAPEUTIC EXERCISES: CPT

## 2020-08-18 NOTE — THERAPY DISCHARGE NOTE
Outpatient Physical Therapy Ortho Treatment Note/Discharge Summary  [facility]     Patient Name: Primo Mesa  : 1965  MRN: [id number]  Today's Date: 2020      Visit Date: 2020   Attendance: 8/10 of 8  Subjective improvement:60% overall performing task; 5% improvement with PT  Recert:20  MD Appointment: 20      Visit Dx:    ICD-10-CM ICD-9-CM   1. Acute left-sided low back pain without sciatica M54.5 724.2       Patient Active Problem List   Diagnosis   • Acute bronchitis   • Cerebrovascular accident (CVA) (CMS/Formerly [facility])        Past Medical History:   Diagnosis Date   • Chest pain    • Dehydration    • Dizziness    • Hypertension         Past Surgical History:   Procedure Laterality Date   • APPENDECTOMY     • LEG SURGERY         PT Ortho     Row Name 20 0800       Posture/Observations    Posture/Observations Comments  Amb with guarded posture.  L mild antalgic gt from old sx.  Slight L lateral lean and FF posture.   -EM       Head/Neck/Trunk    Trunk Extension AROM  15 deg  -EM    Trunk Flexion AROM  20 cm from floor  -EM    Trunk Lt Lateral Flexion AROM  47 cm from floor  -EM    Trunk Rt Lateral Flexion AROM  52.5 cm from floor  -EM       MMT Right Lower Ext    Rt Hip Flexion MMT, Gross Movement  (4+/5) good plus  -EM    Rt Hip Extension MMT, Gross Movement  (4/5) good  -EM    Rt Hip ABduction MMT, Gross Movement  (5/5) normal  -EM    Rt Hip Internal (Medial) Rotation MMT, Gross Movement  (4+/5) good plus  -EM    Rt Hip External (Lateral) Rotation MMT, Gross Movement  (5/5) normal  -EM       MMT Left Lower Ext    Lt Hip Flexion MMT, Gross Movement  (4/5) good  -EM    Lt Hip Extension MMT, Gross Movement  (4/5) good  -EM    Lt Hip ABduction MMT, Gross Movement  (4+/5) good plus  -EM    Lt Hip Internal (Medial) Rotation MMT, Gross Movement  (4+/5) good plus  -EM    Lt Hip External (Lateral) Rotation MMT, Gross Movement  (4+/5) good plus  -EM      User Key  (r) = Recorded  By, (t) = Taken By, (c) = Cosigned By    Initials Name Provider Type    EM Raj Thomas, CHARY Physical Therapy Assistant                      PT Assessment/Plan     Row Name 08/18/20 0800          PT Assessment    Functional Limitations  Decreased safety during functional activities;Impaired locomotion;Limitation in home management;Limitations in community activities;Limitations in functional capacity and performance;Performance in leisure activities;Performance in self-care ADL  -EM     Impairments  Impaired muscle length;Muscle strength;Pain;Range of motion;Poor body mechanics  -EM     Assessment Comments  Pt taryn tx well, however has plateaued with progression and improvements recently with pain.  Pt has met 2/7 goals at this time. Pt displays L hip weakness>R Hip weakness. Pt is Ind with HEP. Pts L wrist henders therex progression to quadruped therex at this time.   -EM     Rehab Potential  Good  -EM     Patient/caregiver participated in establishment of treatment plan and goals  Yes  -EM     Patient would benefit from skilled therapy intervention  Yes  -EM        PT Plan    PT Frequency  2x/week  -EM     Predicted Duration of Therapy Intervention (Therapy Eval)  6 wks  -EM     PT Plan Comments  (S) D/C to Ind HEP due to plateau in progress at this time.   -EM       User Key  (r) = Recorded By, (t) = Taken By, (c) = Cosigned By    Initials Name Provider Type    EM Raj Thomas, CHARY Physical Therapy Assistant          Modalities     Row Name 08/18/20 0800             Subjective Pain    Post-Treatment Pain Level  6  -EM         Ice    Ice Applied  Yes  -EM      Location  L-Spine  -EM      Rx Minutes  15 mins  -EM      Ice S/P Rx  Yes  -EM         ELECTRICAL STIMULATION    Attended/Unattended  Unattended  -EM      Stimulation Type  IFC w/ ice  -EM      Max mAmp  12  -EM      Location/Electrode Placement/Other  L-Spine 15'  -EM        User Key  (r) = Recorded By, (t) = Taken By, (c) = Cosigned By    Initials Name  "Provider Type    EM Raj Thomas, CHARY Physical Therapy Assistant          OP Exercises     Row Name 08/18/20 0800             Subjective Comments    Subjective Comments  Pt states he is doing the same. Pt states \"I can tell there is rain on the way, by the way my back hurt me lastnight.\"  -EM         Subjective Pain    Able to rate subjective pain?  yes  -EM      Pre-Treatment Pain Level  6  -EM      Post-Treatment Pain Level  6  -EM         Exercise 2    Exercise Name 2  B Seated Piriformis S  -EM      Sets 2  3  -EM      Time 2  30\"  -EM         Exercise 3    Exercise Name 3  B St Ham S  -EM      Sets 3  3  -EM      Time 3  30\"  -EM         Exercise 4    Exercise Name 4  Bridges  -EM      Sets 4  1  -EM      Reps 4  30  -EM         Exercise 5    Exercise Name 5  HLTR  -EM      Sets 5  1  -EM      Reps 5  20  -EM         Exercise 6    Exercise Name 6  Dead Bug  -EM      Sets 6  1  -EM      Reps 6  30  -EM         Exercise 7    Exercise Name 7  Crunches: Grade 1  -EM      Sets 7  1  -EM      Reps 7  30  -EM         Exercise 8    Exercise Name 8  Marches on Physioball  -EM      Sets 8  1  -EM      Reps 8  30  -EM         Exercise 9    Exercise Name 9  Up/Middle/Down with MB on Physioball  -EM      Sets 9  1  -EM      Reps 9  20  -EM      Additional Comments  4# MB  -EM         Exercise 10    Exercise Name 10  Diagonal Chops with MB on Physioball  -EM      Sets 10  1  -EM      Reps 10  20  -EM      Additional Comments  4# MB  -EM         Exercise 11    Exercise Name 11  IFC w/ ice  -EM      Time 11  15'  -EM        User Key  (r) = Recorded By, (t) = Taken By, (c) = Cosigned By    Initials Name Provider Type    EM Raj Thomas, CHARY Physical Therapy Assistant                         PT OP Goals     Row Name 08/18/20 0800          PT Short Term Goals    STG Date to Achieve  08/07/20  -EM     STG 1  Patient will be independent with HEP.   -EM     STG 1 Progress  (S) Met  -EM     STG 2  Perform 5x sit to stand.   -EM  " "   STG 2 Progress  (S) Met  -EM        Long Term Goals    LTG Date to Achieve  08/28/20  -EM     LTG 1  Patient will score 22% on modified Oswestry.   -EM     LTG 1 Progress  Not Met  -EM     LTG 2  Patient will exhibit 100% active flexion and sidebending and 50% extension at trunk.   -EM     LTG 2 Progress  Not Met  -EM     LTG 3  Patient will exhibit 5/5 bilateral hip and IR strength.   -EM     LTG 3 Progress  Not Met  -EM     LTG 4  Patient will be able to weed eat yard for 30' without exacerbation of symptoms.   -EM     LTG 4 Progress  Not Met  -EM     LTG 5  Patient will be able to perform 5x sit to stand without UE support in 10\".   -EM     LTG 5 Progress  Not Met  -EM        Time Calculation    PT Goal Re-Cert Due Date  08/26/20  -EM       User Key  (r) = Recorded By, (t) = Taken By, (c) = Cosigned By    Initials Name Provider Type    EM Raj Thomas PTA Physical Therapy Assistant               Outcome Measure Options: Modifed [place]  Modified Oswestry  Modified Oswestry Score/Comments: 27/50=54%      Time Calculation:   Start Time: 0800  Stop Time: 0900  Time Calculation (min): 60 min  Total Timed Code Minutes- PT: 60 minute(s)  Therapy Charges for Today     Code Description Service Date Service Provider Modifiers Qty    38075056594 HC PT THER PROC EA 15 MIN 8/18/2020 Raj Thomas PTA GP 3    72347520269 HC PT ELECTRICAL STIM UNATTENDED 8/18/2020 Raj Thomas PTA  1          PT G-Codes  Outcome Measure Options: Modifed Cherylestrheath  Modified Oswestry Score/Comments: 27/50=54%            Raj Thomas PTA  8/18/2020       "

## 2020-08-19 ENCOUNTER — APPOINTMENT (OUTPATIENT)
Dept: PHYSICAL THERAPY | Facility: HOSPITAL | Age: 55
End: 2020-08-19

## 2020-08-24 ENCOUNTER — APPOINTMENT (OUTPATIENT)
Dept: PHYSICAL THERAPY | Facility: HOSPITAL | Age: 55
End: 2020-08-24

## 2020-08-26 ENCOUNTER — APPOINTMENT (OUTPATIENT)
Dept: PHYSICAL THERAPY | Facility: HOSPITAL | Age: 55
End: 2020-08-26

## 2020-09-02 NOTE — OUTREACH NOTE
Date of Surgery:  9-2-20  Pre-operative Diagnosis:  right knee arthritis, h/o Ehler's-Danlos Syndrome    Post-operative Diagnosis:  right knee arthritis, h/o Ehler's-Danlos Syndrome    Procedure:  right knee replacement    Implants used:  A Smith Nephew Legion PS total knee arthroplasty system with the following components  Femur: 5 right Oxinium Narrow  Tibia: 5 right with 10 x 100 stem extension  Polyethylene: 13 mm Constrained  Patella: 32 oval  Fixation: 2 packages Simplex HV    Surgeon:  Tiago Dumont MD    1st Assistant:   LUIS EDUARDO Bloom    Anesthesiologist:   RAFAT Naranjo DO    EBL:  100 cc    Specimen:  none    Indications for procedure:  This patient is a 65 y.o. male with a long standing history of right knee arthritis. The patient had failed conservative therapy including anti-inflammatory medications, activity modifications, injections, physical therapy and assistive devices. He was indicated for a right total knee replacement. The patient expressed an understanding of the risks of pain, bleeding, infection, dislocation, malalignment, need for further surgery, damage to surrounding structures, neurovascular compromise, blood clots, leg-length discrepancy both apparent and actual, anesthetic complications, and serious medical consequences including but not limited to heart attack, stroke or even death. It was explained that the implants are man-made products and thus subject to possible failure.  The patient expressed an understanding and wished to proceed. Specific risks based on the patient's medical history were addressed. His history of connective tissue disorder did mean we had constrained components available. A clearance was obtained by the medical physicians and the patient was taken to the operating room on the day of surgery for the above named procedure.     Description of procedure:  The patient was met in the pre-operative holding area. The right knee was marked as the appropriate surgical site  Stroke Week 2 Survey      Responses   [facility] patient discharged from?  [place]   Does the patient have one of the following disease processes/diagnoses(primary or secondary)?  Stroke (TIA)   Week 2 attempt successful?  Yes   Call start time  1514   Call end time  1517   Discharge diagnosis   acute CVA   What is the patient's perception of their health status since discharge?  Improving   Is the patient/caregiver able to teach back the hierarchy of who to call/visit for symptoms/problems? PCP, Specialist, Home health nurse, Urgent Care, ED, 911  Yes   Additional teach back comments  Patient says he is doing well.   Week 2 call completed?  Yes   Wrap up additional comments  quick call because we got disconnected but patient was able to tell me he was doing better, was unable to reach him again.          Mary Augustin RN   "with indelible ink. They were taken to the operating room where the anesthesia department started a adductor/GETA for intraoperative and post-operative anesthesia and pain control. The patient was placed on the OR table and a tourniquet was placed high on the operative thigh. All bony prominences were padded appropriately. The right knee was prepped and draped in a standard sterile surgical fashion. A time out procedure was called to verify the side and site of surgery, the proposed surgical procedure, and the administration of pre-operative antibiotics. After successful completion of the time out procedure, our attention was turned to the right knee.  The tourniquet was inflated after exsanguination with an Esmarch bandage. The knee was flexed and a straight incision was made just medial to the midline. The capsule of the knee was cleared and a medial parapatellar arthrotomy was performed. The patella was subluxated laterally and a medial release was performed to properly visualize the posteromedial aspect of the tibial plateau. The knee was extended, the patellar tendon was protected and the infrapatellar fat pad was excised. A lateral release was performed. The patella was turned on its side and held in place with two penetrating towel clips. A free-hand patellar cut was made, the patella was sized and the appropriate lug holes were drilled. The patella was subluxed, the knee was flexed. The tourniquet was released. Hemostasis was obtained. Each hemijoint was cleared of soft tissue. The ACL was removed. The femoral canal was entered with the step drill and the distal femoral cutting guide was pinned in place in 5 degrees of valgus. The distal cut was made and the bony pieces were removed. The femur was sized to a size 5 Narrow and the appropriate cutting jig was pinned in place in 3 degrees of external rotation. The bony cuts were made, we noted a \"grand piano sign\" anteriorly. The box for the PS post was cut in " the standard fashion. The patient's anatomy was noted to be appropriate for the narrow implant and this was chosen for him.   Now the femur was retracted posteriorly with a lap sponge to protect the intercondylar area. The proximal tibia was exposed, the depth of our resection was based on taking 2 mm off the low side of the bone. We used the intramedullary drill to enter the tibial canal and set our alignment based on the patient's anatomy.  We first set our depth and then our checked our varus/valgus alignment with the extramedullary guide sherin. We made our bony cuts and removed the tibial piece en bloc. The laminar spreaders were placed and the hypercurved osteotomes were used to remove posterior femoral osteophytes. The ligaments were balanced in flexion and extension.   The tibia was then sized to a size 5 and the trial component was placed in the proper amount of external rotation. A trial femoral component was placed and with the 13 mm Constrained polyethylene we noted full extension without recurvatum and greater than 125 degrees of flexion with appropriate patellar tracking. At this point we removed the trial components and thoroughly irrigated the wound. Osteophytes were removed. The tourniquet was reinflated. We did prep for a stem extension given that we were placing the constrained component. The real components were opened in a sterile fashion on the back table and then cemented into place sequentially, first the tibia, then the femur, then the patella. The cement was allowed to cure with the trial polyethylene component in place. After the cement had hardened and all excess cement was removed, the knee was taken through a range of motion. Again we noted full extension and greater than 125 degrees of flexion with appropriate varus/valgus stability and patellar tracking. Therefore the real polyethylene was opened and inserted into the tibial tray. An audible click was heard as it engaged the tibia. Now a  dilute betadine solution with 3 grams of tranexamic acid was instilled into the knee for 3 minutes before being pulse-lavaged out. The knee was flexed, the arthrotomy was closed with #1 Quill, followed by 2-0 Vicryl in the deep dermal layer in a buried interrupted fashion. The skin was closed with 3-0 monocryl in a running subcuticular fashion, and a sterile Tariq/Acticoat dressing was applied. The patient is currently in the operating room awaiting reversal of anesthesia.

## 2021-03-23 ENCOUNTER — TRANSCRIBE ORDERS (OUTPATIENT)
Dept: PHYSICAL THERAPY | Facility: HOSPITAL | Age: 56
End: 2021-03-23

## 2021-03-23 DIAGNOSIS — M79.18 MYOFASCIAL PAIN: ICD-10-CM

## 2021-03-23 DIAGNOSIS — M47.812 CERVICAL SPONDYLOSIS: ICD-10-CM

## 2021-03-23 DIAGNOSIS — M47.816 LUMBAR SPONDYLOSIS: Primary | ICD-10-CM

## 2022-11-08 ENCOUNTER — APPOINTMENT (OUTPATIENT)
Dept: GENERAL RADIOLOGY | Facility: HOSPITAL | Age: 57
End: 2022-11-08

## 2022-11-08 ENCOUNTER — HOSPITAL ENCOUNTER (EMERGENCY)
Facility: HOSPITAL | Age: 57
Discharge: HOME | End: 2022-11-08
Attending: STUDENT IN AN ORGANIZED HEALTH CARE EDUCATION/TRAINING PROGRAM | Admitting: STUDENT IN AN ORGANIZED HEALTH CARE EDUCATION/TRAINING PROGRAM

## 2022-11-08 VITALS
SYSTOLIC BLOOD PRESSURE: 107 MMHG | RESPIRATION RATE: 18 BRPM | HEART RATE: 70 BPM | HEIGHT: 72 IN | DIASTOLIC BLOOD PRESSURE: 78 MMHG | TEMPERATURE: 97.6 F | WEIGHT: 210 LBS | BODY MASS INDEX: 28.44 KG/M2 | OXYGEN SATURATION: 95 %

## 2022-11-08 DIAGNOSIS — R06.02 SOB (SHORTNESS OF BREATH): Primary | ICD-10-CM

## 2022-11-08 LAB
ALBUMIN SERPL-MCNC: 4.5 G/DL (ref 3.5–5.2)
ALBUMIN/GLOB SERPL: 1.8 G/DL
ALP SERPL-CCNC: 85 U/L (ref 39–117)
ALT SERPL W P-5'-P-CCNC: 23 U/L (ref 1–41)
ANION GAP SERPL CALCULATED.3IONS-SCNC: 12 MMOL/L (ref 5–15)
AST SERPL-CCNC: 15 U/L (ref 1–40)
BASOPHILS # BLD AUTO: 0.05 10*3/MM3 (ref 0–0.2)
BASOPHILS NFR BLD AUTO: 0.6 % (ref 0–1.5)
BILIRUB SERPL-MCNC: 0.7 MG/DL (ref 0–1.2)
BUN SERPL-MCNC: 26 MG/DL (ref 6–20)
BUN/CREAT SERPL: 25.2 (ref 7–25)
CALCIUM SPEC-SCNC: 9.2 MG/DL (ref 8.6–10.5)
CHLORIDE SERPL-SCNC: 107 MMOL/L (ref 98–107)
CO2 SERPL-SCNC: 21 MMOL/L (ref 22–29)
CREAT SERPL-MCNC: 1.03 MG/DL (ref 0.76–1.27)
DEPRECATED RDW RBC AUTO: 40 FL (ref 37–54)
EGFRCR SERPLBLD CKD-EPI 2021: 86.3 ML/MIN/1.73
EOSINOPHIL # BLD AUTO: 0.17 10*3/MM3 (ref 0–0.4)
EOSINOPHIL NFR BLD AUTO: 2.2 % (ref 0.3–6.2)
ERYTHROCYTE [DISTWIDTH] IN BLOOD BY AUTOMATED COUNT: 13 % (ref 12.3–15.4)
GLOBULIN UR ELPH-MCNC: 2.5 GM/DL
GLUCOSE SERPL-MCNC: 94 MG/DL (ref 65–99)
HCT VFR BLD AUTO: 41.7 % (ref 37.5–51)
HGB BLD-MCNC: 14.4 G/DL (ref 13–17.7)
HOLD SPECIMEN: NORMAL
HOLD SPECIMEN: NORMAL
IMM GRANULOCYTES # BLD AUTO: 0.01 10*3/MM3 (ref 0–0.05)
IMM GRANULOCYTES NFR BLD AUTO: 0.1 % (ref 0–0.5)
LYMPHOCYTES # BLD AUTO: 2.64 10*3/MM3 (ref 0.7–3.1)
LYMPHOCYTES NFR BLD AUTO: 33.8 % (ref 19.6–45.3)
MCH RBC QN AUTO: 29.4 PG (ref 26.6–33)
MCHC RBC AUTO-ENTMCNC: 34.5 G/DL (ref 31.5–35.7)
MCV RBC AUTO: 85.3 FL (ref 79–97)
MONOCYTES # BLD AUTO: 0.73 10*3/MM3 (ref 0.1–0.9)
MONOCYTES NFR BLD AUTO: 9.4 % (ref 5–12)
NEUTROPHILS NFR BLD AUTO: 4.2 10*3/MM3 (ref 1.7–7)
NEUTROPHILS NFR BLD AUTO: 53.9 % (ref 42.7–76)
NRBC BLD AUTO-RTO: 0 /100 WBC (ref 0–0.2)
NT-PROBNP SERPL-MCNC: 24.8 PG/ML (ref 0–900)
PLATELET # BLD AUTO: 170 10*3/MM3 (ref 140–450)
PMV BLD AUTO: 12.2 FL (ref 6–12)
POTASSIUM SERPL-SCNC: 3.9 MMOL/L (ref 3.5–5.2)
PROT SERPL-MCNC: 7 G/DL (ref 6–8.5)
QT INTERVAL: 394 MS
QTC INTERVAL: 443 MS
RBC # BLD AUTO: 4.89 10*6/MM3 (ref 4.14–5.8)
SODIUM SERPL-SCNC: 140 MMOL/L (ref 136–145)
TROPONIN T SERPL-MCNC: <0.01 NG/ML (ref 0–0.03)
WBC NRBC COR # BLD: 7.8 10*3/MM3 (ref 3.4–10.8)
WHOLE BLOOD HOLD COAG: NORMAL
WHOLE BLOOD HOLD SPECIMEN: NORMAL

## 2022-11-08 PROCEDURE — 99283 EMERGENCY DEPT VISIT LOW MDM: CPT

## 2022-11-08 PROCEDURE — 93005 ELECTROCARDIOGRAM TRACING: CPT

## 2022-11-08 PROCEDURE — 85025 COMPLETE CBC W/AUTO DIFF WBC: CPT | Performed by: STUDENT IN AN ORGANIZED HEALTH CARE EDUCATION/TRAINING PROGRAM

## 2022-11-08 PROCEDURE — 83880 ASSAY OF NATRIURETIC PEPTIDE: CPT | Performed by: STUDENT IN AN ORGANIZED HEALTH CARE EDUCATION/TRAINING PROGRAM

## 2022-11-08 PROCEDURE — 71046 X-RAY EXAM CHEST 2 VIEWS: CPT

## 2022-11-08 PROCEDURE — 36415 COLL VENOUS BLD VENIPUNCTURE: CPT

## 2022-11-08 PROCEDURE — 84484 ASSAY OF TROPONIN QUANT: CPT | Performed by: STUDENT IN AN ORGANIZED HEALTH CARE EDUCATION/TRAINING PROGRAM

## 2022-11-08 PROCEDURE — 80053 COMPREHEN METABOLIC PANEL: CPT | Performed by: STUDENT IN AN ORGANIZED HEALTH CARE EDUCATION/TRAINING PROGRAM

## 2022-11-08 PROCEDURE — 93010 ELECTROCARDIOGRAM REPORT: CPT | Performed by: INTERNAL MEDICINE

## 2022-11-08 RX ORDER — SODIUM CHLORIDE 0.9 % (FLUSH) 0.9 %
10 SYRINGE (ML) INJECTION AS NEEDED
Status: DISCONTINUED | OUTPATIENT
Start: 2022-11-08 | End: 2022-11-08 | Stop reason: HOSPADM

## 2022-11-08 NOTE — ED PROVIDER NOTES
"Subjective   History of Present Illness  56-year-old male comes to the ER chief complaint of shortness of breath with exertion and an occasional nonproductive cough.  Is been going on for most of the day.  Yesterday he did a lot of sanding and inhaled a lot of dust.  It has never bothered him before.  He denies having a history of lung problems.  Does not wear oxygen at home.  Denies other symptoms.    History provided by:  Patient   used: No        Review of Systems   Constitutional: Negative for chills and fever.   HENT: Negative for drooling.    Eyes: Negative for redness.   Respiratory: Positive for cough and shortness of breath. Negative for apnea, chest tightness and wheezing.    Cardiovascular: Negative for chest pain.   Gastrointestinal: Negative for vomiting.   Genitourinary: Negative for flank pain.   Skin: Negative for color change.   Neurological: Negative for seizures.   Psychiatric/Behavioral: Negative for confusion.       Past Medical History:   Diagnosis Date   • Chest pain    • Dehydration    • Dizziness    • Hypertension        No Known Allergies    Past Surgical History:   Procedure Laterality Date   • APPENDECTOMY     • LEG SURGERY         Family History   Problem Relation Age of Onset   • Heart disease Mother    • Diabetes Mother    • Lung cancer Father    • Leukemia Sister    • Diabetes Sister    • Diabetes Brother        Social History     Socioeconomic History   • Marital status: Single   Tobacco Use   • Smoking status: Every Day     Packs/day: 0.50     Types: Cigarettes   • Smokeless tobacco: Never   Substance and Sexual Activity   • Alcohol use: No     Comment: \"very little\"   • Drug use: No   • Sexual activity: Defer           Objective    Vitals:    11/08/22 1343 11/08/22 1548   BP: 129/84 107/78   BP Location: Right arm Left arm   Patient Position: Sitting Sitting   Pulse: 75 70   Resp: 18 18   Temp: 97.6 °F (36.4 °C)    TempSrc: Oral    SpO2: 96% 95%   Weight: 95.3 " "kg (210 lb)    Height: 182.9 cm (72\")        Physical Exam  Vitals and nursing note reviewed.   Constitutional:       General: He is not in acute distress.     Appearance: He is well-developed. He is not ill-appearing, toxic-appearing or diaphoretic.   HENT:      Head: Normocephalic.      Right Ear: External ear normal.      Left Ear: External ear normal.   Eyes:      Conjunctiva/sclera: Conjunctivae normal.   Pulmonary:      Effort: Pulmonary effort is normal. No accessory muscle usage or respiratory distress.      Breath sounds: No wheezing.   Chest:      Chest wall: No tenderness.   Abdominal:      General: Bowel sounds are normal.      Palpations: Abdomen is soft.      Tenderness: There is no abdominal tenderness (deep palpation).   Skin:     General: Skin is warm and dry.      Capillary Refill: Capillary refill takes less than 2 seconds.   Neurological:      Mental Status: He is alert and oriented to person, place, and time.   Psychiatric:         Behavior: Behavior normal.         Procedures           ED Course      Results for orders placed or performed during the hospital encounter of 11/08/22   Comprehensive Metabolic Panel    Specimen: Blood   Result Value Ref Range    Glucose 94 65 - 99 mg/dL    BUN 26 (H) 6 - 20 mg/dL    Creatinine 1.03 0.76 - 1.27 mg/dL    Sodium 140 136 - 145 mmol/L    Potassium 3.9 3.5 - 5.2 mmol/L    Chloride 107 98 - 107 mmol/L    CO2 21.0 (L) 22.0 - 29.0 mmol/L    Calcium 9.2 8.6 - 10.5 mg/dL    Total Protein 7.0 6.0 - 8.5 g/dL    Albumin 4.50 3.50 - 5.20 g/dL    ALT (SGPT) 23 1 - 41 U/L    AST (SGOT) 15 1 - 40 U/L    Alkaline Phosphatase 85 39 - 117 U/L    Total Bilirubin 0.7 0.0 - 1.2 mg/dL    Globulin 2.5 gm/dL    A/G Ratio 1.8 g/dL    BUN/Creatinine Ratio 25.2 (H) 7.0 - 25.0    Anion Gap 12.0 5.0 - 15.0 mmol/L    eGFR 86.3 >60.0 mL/min/1.73   BNP    Specimen: Blood   Result Value Ref Range    proBNP 24.8 0.0 - 900.0 pg/mL   Troponin    Specimen: Blood   Result Value Ref Range "    Troponin T <0.010 0.000 - 0.030 ng/mL   CBC Auto Differential    Specimen: Blood   Result Value Ref Range    WBC 7.80 3.40 - 10.80 10*3/mm3    RBC 4.89 4.14 - 5.80 10*6/mm3    Hemoglobin 14.4 13.0 - 17.7 g/dL    Hematocrit 41.7 37.5 - 51.0 %    MCV 85.3 79.0 - 97.0 fL    MCH 29.4 26.6 - 33.0 pg    MCHC 34.5 31.5 - 35.7 g/dL    RDW 13.0 12.3 - 15.4 %    RDW-SD 40.0 37.0 - 54.0 fl    MPV 12.2 (H) 6.0 - 12.0 fL    Platelets 170 140 - 450 10*3/mm3    Neutrophil % 53.9 42.7 - 76.0 %    Lymphocyte % 33.8 19.6 - 45.3 %    Monocyte % 9.4 5.0 - 12.0 %    Eosinophil % 2.2 0.3 - 6.2 %    Basophil % 0.6 0.0 - 1.5 %    Immature Grans % 0.1 0.0 - 0.5 %    Neutrophils, Absolute 4.20 1.70 - 7.00 10*3/mm3    Lymphocytes, Absolute 2.64 0.70 - 3.10 10*3/mm3    Monocytes, Absolute 0.73 0.10 - 0.90 10*3/mm3    Eosinophils, Absolute 0.17 0.00 - 0.40 10*3/mm3    Basophils, Absolute 0.05 0.00 - 0.20 10*3/mm3    Immature Grans, Absolute 0.01 0.00 - 0.05 10*3/mm3    nRBC 0.0 0.0 - 0.2 /100 WBC   ECG 12 Lead Other; shortness of breath   Result Value Ref Range    QT Interval 394 ms    QTC Interval 443 ms     XR Chest 2 View   Final Result   Stable exam without acute cardiopulmonary process.      Electronically signed by:  Augustine Li MD  11/8/2022 4:11 PM CST   Workstation: [id number]           [facility]  Number of Diagnoses or Management Options  SOB (shortness of breath): new and requires workup  Diagnosis management comments: Vital signs are stable, afebrile.  Satting well on room air no respiratory distress.  Labs are unremarkable.  Chest x-ray shows no acute cardiopulmonary processes.  Recommend follow-up with his PCP.  Return precautions given.  Patient states understanding and is agreeable to the plan.      Final diagnoses:   SOB (shortness of breath)       ED Disposition  ED Disposition     ED Disposition   Discharge    Condition   Stable    Comment   --             Becky Khan, DO  [address] [facility]  [place]  [address]  [phone]    Schedule an appointment as soon as possible for a visit in 2 days  ER follow up         Medication List      No changes were made to your prescriptions during this visit.          Raphael Asencio MD  11/08/22 [id number]

## 2023-06-03 ENCOUNTER — HOSPITAL ENCOUNTER (EMERGENCY)
Facility: HOSPITAL | Age: 58
Discharge: HOME | End: 2023-06-03
Attending: EMERGENCY MEDICINE
Payer: COMMERCIAL

## 2023-06-03 VITALS
HEART RATE: 81 BPM | HEIGHT: 72 IN | RESPIRATION RATE: 18 BRPM | WEIGHT: 209 LBS | DIASTOLIC BLOOD PRESSURE: 81 MMHG | SYSTOLIC BLOOD PRESSURE: 134 MMHG | OXYGEN SATURATION: 96 % | TEMPERATURE: 98.1 F | BODY MASS INDEX: 28.31 KG/M2

## 2023-06-03 DIAGNOSIS — S16.1XXA NECK STRAIN, INITIAL ENCOUNTER: Primary | ICD-10-CM

## 2023-06-03 PROCEDURE — 99282 EMERGENCY DEPT VISIT SF MDM: CPT

## 2023-06-03 PROCEDURE — 96372 THER/PROPH/DIAG INJ SC/IM: CPT

## 2023-06-03 PROCEDURE — 25010000002 KETOROLAC TROMETHAMINE PER 15 MG: Performed by: EMERGENCY MEDICINE

## 2023-06-03 RX ORDER — IBUPROFEN 600 MG/1
600 TABLET ORAL EVERY 8 HOURS PRN
Qty: 21 TABLET | Refills: 0 | Status: SHIPPED | OUTPATIENT
Start: 2023-06-03

## 2023-06-03 RX ORDER — CYCLOBENZAPRINE HCL 10 MG
10 TABLET ORAL 3 TIMES DAILY PRN
Qty: 15 TABLET | Refills: 0 | Status: SHIPPED | OUTPATIENT
Start: 2023-06-03

## 2023-06-03 RX ORDER — HYDROCODONE BITARTRATE AND ACETAMINOPHEN 5; 325 MG/1; MG/1
1 TABLET ORAL EVERY 6 HOURS PRN
Qty: 15 TABLET | Refills: 0 | Status: SHIPPED | OUTPATIENT
Start: 2023-06-03

## 2023-06-03 RX ORDER — KETOROLAC TROMETHAMINE 30 MG/ML
60 INJECTION, SOLUTION INTRAMUSCULAR; INTRAVENOUS ONCE
Status: COMPLETED | OUTPATIENT
Start: 2023-06-03 | End: 2023-06-03

## 2023-06-03 RX ADMIN — KETOROLAC TROMETHAMINE 60 MG: 30 INJECTION, SOLUTION INTRAMUSCULAR; INTRAVENOUS at 23:18

## 2023-06-03 NOTE — Clinical Note
[facility] EMERGENCY DEPARTMENT  [address] [place] [address]  Phone: [phone]    Primo Mesa was seen and treated in our emergency department on 6/3/2023.  He may return to work on 06/06/2023.         Thank you for choosing [facility].    Hemanth Murillo MD

## 2023-06-04 NOTE — ED PROVIDER NOTES
Subjective   History of Present Illness  Patient presents emergency department complaint of neck pain.  Patient notes that he thinks he hurt his neck several days ago while he was moving some things in a storage shed and possibly slept on it wrong where he had soreness in the paraspinal muscles.  Patient noted yesterday moving a couch by himself he got somewhat upset and threw the couch and felt a pop in his neck.  Patient has had some torticollis since that time if any range of motion to the right or left causing the patient pain.  Patient has had no radicular symptoms.  There is been no focal neurologic deficits.  No loss of consciousness.  No dizziness or weakness.    History provided by:  Patient    Review of Systems   Constitutional: Negative.  Negative for appetite change, chills and fever.   HENT: Negative.  Negative for congestion.    Eyes: Negative.  Negative for photophobia and visual disturbance.   Respiratory: Negative.  Negative for cough, chest tightness and shortness of breath.    Cardiovascular: Negative.  Negative for chest pain and palpitations.   Gastrointestinal: Negative.  Negative for abdominal pain, constipation, diarrhea, nausea and vomiting.   Endocrine: Negative.    Genitourinary: Negative.  Negative for decreased urine volume, dysuria, flank pain and hematuria.   Musculoskeletal:  Positive for neck pain. Negative for arthralgias, back pain, myalgias and neck stiffness.   Skin: Negative.  Negative for pallor.   Neurological: Negative.  Negative for dizziness, syncope, weakness, light-headedness, numbness and headaches.   Psychiatric/Behavioral: Negative.  Negative for confusion and suicidal ideas. The patient is not nervous/anxious.    All other systems reviewed and are negative.    Past Medical History:   Diagnosis Date    Chest pain     Dehydration     Dizziness     Hypertension        No Known Allergies    Past Surgical History:   Procedure Laterality Date    APPENDECTOMY      LEG  "SURGERY         Family History   Problem Relation Age of Onset    Heart disease Mother     Diabetes Mother     Lung cancer Father     Leukemia Sister     Diabetes Sister     Diabetes Brother        Social History     Socioeconomic History    Marital status: Single   Tobacco Use    Smoking status: Every Day     Packs/day: 0.50     Types: Cigarettes    Smokeless tobacco: Never   Substance and Sexual Activity    Alcohol use: No     Comment: \"very little\"    Drug use: No    Sexual activity: Defer           Objective   Physical Exam  Vitals and nursing note reviewed.   Constitutional:       General: He is in acute distress.      Appearance: He is well-developed. He is not ill-appearing or diaphoretic.   HENT:      Head: Normocephalic and atraumatic.   Eyes:      Conjunctiva/sclera: Conjunctivae normal.   Neck:      Vascular: No JVD.      Comments: Patient with discomfort with movement to the left or right.  No axial load tenderness.  No radicular symptoms are noted.  This is paraspinal into the trapezius muscles.  Cardiovascular:      Rate and Rhythm: Normal rate and regular rhythm.   Pulmonary:      Effort: Pulmonary effort is normal. No respiratory distress.   Abdominal:      General: Bowel sounds are normal. There is no distension.      Palpations: Abdomen is soft. There is no mass.      Tenderness: There is no abdominal tenderness. There is no guarding or rebound.   Musculoskeletal:         General: Normal range of motion.      Cervical back: Rigidity and tenderness present.   Lymphadenopathy:      Cervical: No cervical adenopathy.   Skin:     General: Skin is warm and dry.      Capillary Refill: Capillary refill takes less than 2 seconds.   Neurological:      General: No focal deficit present.      Mental Status: He is alert and oriented to person, place, and time.   Psychiatric:         Behavior: Behavior normal.         Thought Content: Thought content normal.         Judgment: Judgment normal. "       Procedures           ED Course                                             Medical Decision Making  Signs and symptoms consistent with muscle strain of the neck.  No significant midline bony tenderness.  No deformity, no crepitus or step-off.  No upon force trauma to the neck.  No radicular symptoms or neurologic findings.    Problems Addressed:  Neck strain, initial encounter: complicated acute illness or injury    Risk  Prescription drug management.        Final diagnoses:   Neck strain, initial encounter       ED Disposition  ED Disposition       ED Disposition   Discharge    Condition   Stable    Comment   --               Becky Khan, DO  [address] [place] [place] [address]  [phone]    In 1 week  If not improved for further evaluation and care         Medication List        New Prescriptions      cyclobenzaprine 10 MG tablet  Commonly known as: FLEXERIL  Take 1 tablet by mouth 3 (Three) Times a Day As Needed for Muscle Spasms.     HYDROcodone-acetaminophen 5-325 MG per tablet  Commonly known as: NORCO  Take 1 tablet by mouth Every 6 (Six) Hours As Needed for Severe Pain.            Changed      * ibuprofen 600 MG tablet  Commonly known as: ADVIL,MOTRIN  Take 1 tablet by mouth Every 6 (Six) Hours As Needed for Mild Pain .  What changed: Another medication with the same name was added. Make sure you understand how and when to take each.     * ibuprofen 600 MG tablet  Commonly known as: ADVIL,MOTRIN  Take 1 tablet by mouth Every 8 (Eight) Hours As Needed (Pain).  What changed: You were already taking a medication with the same name, and this prescription was added. Make sure you understand how and when to take each.           * This list has 2 medication(s) that are the same as other medications prescribed for you. Read the directions carefully, and ask your doctor or other care provider to review them with you.                   Where to Get Your Medications        These medications  were sent to [facility] [id number] - [place], [place] - [address] [place] [address] [address]A MONAHAN AT [facility] &  [address] - [phone]  - [phone] [address]A MON[place]AN, [facility] [address]      Phone: [phone]   cyclobenzaprine 10 MG tablet  HYDROcodone-acetaminophen 5-325 MG per tablet  ibuprofen 600 MG tablet            Hemanth Murillo MD  06/03/23 4504       Hemanth Murillo MD  06/03/23 7038